# Patient Record
Sex: MALE | Race: WHITE | NOT HISPANIC OR LATINO | Employment: OTHER | URBAN - METROPOLITAN AREA
[De-identification: names, ages, dates, MRNs, and addresses within clinical notes are randomized per-mention and may not be internally consistent; named-entity substitution may affect disease eponyms.]

---

## 2024-08-15 ENCOUNTER — APPOINTMENT (EMERGENCY)
Dept: RADIOLOGY | Facility: HOSPITAL | Age: 54
End: 2024-08-15
Payer: COMMERCIAL

## 2024-08-15 ENCOUNTER — HOSPITAL ENCOUNTER (EMERGENCY)
Facility: HOSPITAL | Age: 54
Discharge: HOME | End: 2024-08-15
Attending: EMERGENCY MEDICINE
Payer: COMMERCIAL

## 2024-08-15 VITALS
SYSTOLIC BLOOD PRESSURE: 166 MMHG | WEIGHT: 164.4 LBS | OXYGEN SATURATION: 97 % | HEIGHT: 73 IN | RESPIRATION RATE: 20 BRPM | DIASTOLIC BLOOD PRESSURE: 98 MMHG | TEMPERATURE: 98.3 F | BODY MASS INDEX: 21.79 KG/M2 | HEART RATE: 112 BPM

## 2024-08-15 DIAGNOSIS — S43.409A SHOULDER SPRAIN: Primary | ICD-10-CM

## 2024-08-15 PROCEDURE — 73030 X-RAY EXAM OF SHOULDER: CPT

## 2024-08-15 PROCEDURE — 99284 EMERGENCY DEPT VISIT MOD MDM: CPT | Performed by: EMERGENCY MEDICINE

## 2024-08-15 PROCEDURE — 99283 EMERGENCY DEPT VISIT LOW MDM: CPT

## 2024-08-15 RX ORDER — ALBUTEROL SULFATE 90 UG/1
2 AEROSOL, METERED RESPIRATORY (INHALATION)
COMMUNITY
Start: 2024-06-17

## 2024-08-15 RX ORDER — NAPROXEN 500 MG/1
500 TABLET ORAL ONCE
Status: COMPLETED | OUTPATIENT
Start: 2024-08-15 | End: 2024-08-15

## 2024-08-15 RX ADMIN — NAPROXEN 500 MG: 500 TABLET ORAL at 19:12

## 2024-08-15 NOTE — DISCHARGE INSTRUCTIONS
Rest arm in sling for 2 days, then remove and do gentle range of motion exercises but don't do anything that is very painful.  Take the anti-inflammatory medicine as prescribed.  Ice the area off and on for 2 days.  Xrays look ok.

## 2024-08-15 NOTE — ED PROVIDER NOTES
History  Chief Complaint   Patient presents with    Shoulder Injury     States he lifted a bag of corn about 1 hour ago and has pain right shoulder. Mother estimates weight of bag 8-10 lbs     53 yo disabled male here with his mother who c/o right shoulder pain after lifting a bag of corn.  Occurred just prior to arrival.  Pt. Says he felt a pop in his shoulder.  No associated symptoms.      History provided by:  Patient and parent   used: No        Prior to Admission Medications   Prescriptions Last Dose Informant Patient Reported? Taking?   UNKNOWN TO PATIENT   Yes Yes   Sig: Patient thinks he takes Lisinopril and Lipitor   albuterol (PROVENTIL HFA,VENTOLIN HFA) 90 mcg/act inhaler   Yes Yes   Sig: Inhale 2 puffs      Facility-Administered Medications: None       Past Medical History:   Diagnosis Date    Asthma     Hypertension     Stroke (HCC)        History reviewed. No pertinent surgical history.    History reviewed. No pertinent family history.  I have reviewed and agree with the history as documented.    E-Cigarette/Vaping    E-Cigarette Use Never User      E-Cigarette/Vaping Substances     Social History     Tobacco Use    Smoking status: Former     Types: Cigarettes    Smokeless tobacco: Never   Vaping Use    Vaping status: Never Used   Substance Use Topics    Alcohol use: Never    Drug use: Not Currently     Types: Methamphetamines       Review of Systems   Constitutional:  Negative for fever.   Respiratory:  Negative for cough.    Gastrointestinal:  Negative for vomiting.   Musculoskeletal:  Negative for neck pain.   Skin:  Negative for wound.       Physical Exam  Physical Exam  Vitals and nursing note reviewed.   Constitutional:       General: He is not in acute distress.     Appearance: He is not ill-appearing.   Cardiovascular:      Rate and Rhythm: Normal rate and regular rhythm.      Heart sounds: Normal heart sounds.   Pulmonary:      Effort: Pulmonary effort is normal. No  respiratory distress.      Breath sounds: Normal breath sounds.   Musculoskeletal:         General: Tenderness present. No deformity. Normal range of motion.      Cervical back: Normal range of motion and neck supple. No tenderness.      Comments: Right shoulder with mild ttp proximal humerus, distal m/s intact, radial pulse palpable.   Skin:     General: Skin is warm and dry.   Neurological:      General: No focal deficit present.      Mental Status: He is alert.   Psychiatric:         Mood and Affect: Mood normal.         Behavior: Behavior normal.         Vital Signs  ED Triage Vitals [08/15/24 1822]   Temperature Pulse Respirations Blood Pressure SpO2   98.3 °F (36.8 °C) (!) 112 20 166/98 97 %      Temp Source Heart Rate Source Patient Position - Orthostatic VS BP Location FiO2 (%)   Temporal Monitor Sitting Left arm --      Pain Score       8           Vitals:    08/15/24 1822   BP: 166/98   Pulse: (!) 112   Patient Position - Orthostatic VS: Sitting         Visual Acuity      ED Medications  Medications   naproxen (NAPROSYN) tablet 500 mg (500 mg Oral Given 8/15/24 1912)       Diagnostic Studies  Results Reviewed       None                   XR shoulder 2+ views RIGHT   ED Interpretation by Annel Pena MD (08/15 1904)   negative                 Procedures  Procedures         ED Course                                 SBIRT 20yo+      Flowsheet Row Most Recent Value   Initial Alcohol Screen: US AUDIT-C     1. How often do you have a drink containing alcohol? 0 Filed at: 08/15/2024 1826   Audit-C Score 0 Filed at: 08/15/2024 1826   BETHANY: How many times in the past year have you...    Used an illegal drug or used a prescription medication for non-medical reasons? Never Filed at: 08/15/2024 1826                      Medical Decision Making  Xrays negative for fracture or dislocation.  Advised rest in sling and ice off and on for 2 days, naprosyn prn.  Note: I called mom to give her results and instructions as  nurse discharged before I got back to room to talk to them.    Amount and/or Complexity of Data Reviewed  Radiology: ordered and independent interpretation performed.    Risk  Prescription drug management.                 Disposition  Final diagnoses:   Shoulder sprain     Time reflects when diagnosis was documented in both MDM as applicable and the Disposition within this note       Time User Action Codes Description Comment    8/15/2024  7:04 PM Annel Pena Add [S43.409A] Shoulder sprain           ED Disposition       ED Disposition   Discharge    Condition   Stable    Date/Time   Thu Aug 15, 2024 1904    Comment   Teto Hernandez III discharge to home/self care.                   Follow-up Information       Follow up With Specialties Details Why Contact Info    Guanaco Gottlieb MD Orthopedic Surgery  As needed, If symptoms worsen [address], [address]  [facility] [address] [phone]              Discharge Medication List as of 8/15/2024  7:06 PM        CONTINUE these medications which have NOT CHANGED    Details   albuterol (PROVENTIL HFA,VENTOLIN HFA) 90 mcg/act inhaler Inhale 2 puffs, Starting Mon 6/17/2024, Historical Med      UNKNOWN TO PATIENT Patient thinks he takes Lisinopril and Lipitor, Historical Med             No discharge procedures on file.    PDMP Review       None            ED Provider  Electronically Signed by             Annel Pena MD  08/15/24 6425

## 2024-11-14 ENCOUNTER — TELEPHONE (OUTPATIENT)
Age: 54
End: 2024-11-14

## 2024-11-14 NOTE — TELEPHONE ENCOUNTER
Pt called and asked if we were taking new pt, I told him yes.  I asked him if he ever saw WM before, he said no.  I explained that due to his BMI, we would not be able to see him since he is not considered over weight.  Pt said thank you and hung up

## 2024-11-19 ENCOUNTER — OFFICE VISIT (OUTPATIENT)
Dept: INTERNAL MEDICINE CLINIC | Facility: CLINIC | Age: 54
End: 2024-11-19
Payer: COMMERCIAL

## 2024-11-19 VITALS
HEART RATE: 96 BPM | BODY MASS INDEX: 22.93 KG/M2 | DIASTOLIC BLOOD PRESSURE: 88 MMHG | OXYGEN SATURATION: 94 % | SYSTOLIC BLOOD PRESSURE: 152 MMHG | HEIGHT: 73 IN | WEIGHT: 173 LBS

## 2024-11-19 DIAGNOSIS — E78.2 MIXED HYPERLIPIDEMIA: ICD-10-CM

## 2024-11-19 DIAGNOSIS — Z11.59 NEED FOR HEPATITIS C SCREENING TEST: ICD-10-CM

## 2024-11-19 DIAGNOSIS — J44.9 CHRONIC OBSTRUCTIVE PULMONARY DISEASE, UNSPECIFIED COPD TYPE (HCC): ICD-10-CM

## 2024-11-19 DIAGNOSIS — Z13.1 ENCOUNTER FOR SCREENING FOR DIABETES MELLITUS: ICD-10-CM

## 2024-11-19 DIAGNOSIS — I63.9 CEREBROVASCULAR ACCIDENT (CVA), UNSPECIFIED MECHANISM (HCC): Primary | ICD-10-CM

## 2024-11-19 DIAGNOSIS — Z11.4 SCREENING FOR HIV (HUMAN IMMUNODEFICIENCY VIRUS): ICD-10-CM

## 2024-11-19 PROCEDURE — 99204 OFFICE O/P NEW MOD 45 MIN: CPT

## 2024-11-19 RX ORDER — LISINOPRIL 10 MG/1
10 TABLET ORAL DAILY
COMMUNITY

## 2024-11-19 RX ORDER — ALBUTEROL SULFATE 0.83 MG/ML
2.5 SOLUTION RESPIRATORY (INHALATION) 3 TIMES DAILY PRN
Qty: 60 ML | Refills: 0 | Status: SHIPPED | OUTPATIENT
Start: 2024-11-19 | End: 2024-11-23

## 2024-11-19 RX ORDER — ASPIRIN 81 MG/1
81 TABLET ORAL DAILY
Qty: 90 TABLET | Refills: 1 | Status: SHIPPED | OUTPATIENT
Start: 2024-11-19

## 2024-11-19 RX ORDER — MONTELUKAST SODIUM 10 MG/1
10 TABLET ORAL
COMMUNITY
End: 2024-11-19 | Stop reason: SDUPTHER

## 2024-11-19 RX ORDER — MONTELUKAST SODIUM 10 MG/1
10 TABLET ORAL
Qty: 90 TABLET | Refills: 1 | Status: SHIPPED | OUTPATIENT
Start: 2024-11-19

## 2024-11-19 RX ORDER — ALBUTEROL SULFATE 90 UG/1
2 INHALANT RESPIRATORY (INHALATION) EVERY 6 HOURS PRN
Qty: 18 G | Refills: 1 | Status: SHIPPED | OUTPATIENT
Start: 2024-11-19

## 2024-11-19 RX ORDER — ALBUTEROL SULFATE 0.83 MG/ML
2.5 SOLUTION RESPIRATORY (INHALATION) 3 TIMES DAILY PRN
COMMUNITY
End: 2024-11-19 | Stop reason: SDUPTHER

## 2024-11-19 RX ORDER — ATORVASTATIN CALCIUM 80 MG/1
80 TABLET, FILM COATED ORAL DAILY
Qty: 90 TABLET | Refills: 1 | Status: SHIPPED | OUTPATIENT
Start: 2024-11-19

## 2024-11-19 NOTE — PROGRESS NOTES
Name: Teto Hernandez III      : 1970      MRN: [id number]  Encounter Provider: Juana White MD  Encounter Date: 2024   Encounter department: [facility] INTERNAL MEDICINE  :  Assessment & Plan  Cerebrovascular accident (CVA), unspecified mechanism (HCC)  CVA x 2 ( & )  Pt recently incarcerated, has not been following with Neurology  Has not been taking DAPT (Plavix and asa)  Referral provided for Neurology  Restart asa and follow up with Neuro for recommendation to restart Plavix  Continue with high dose statin therapy  Check basic labs   Orders:    aspirin (Aspirin 81) 81 mg EC tablet; Take 1 tablet (81 mg total) by mouth daily    Ambulatory Referral to Neurology; Future    atorvastatin (LIPITOR) 80 mg tablet; Take 1 tablet (80 mg total) by mouth daily    CBC and differential; Future    Comprehensive metabolic panel; Future    Lipid Panel with Direct LDL reflex; Future    TSH, 3rd generation with Free T4 reflex; Future    Hemoglobin A1C; Future    Chronic obstructive pulmonary disease, unspecified COPD type (HCC)  Continue with albuterol neb treatments and Singulair  Referral provided to Pulm for evaluation and treatment  Orders:    Ambulatory Referral to Pulmonology; Future    montelukast (SINGULAIR) 10 mg tablet; Take 1 tablet (10 mg total) by mouth daily at bedtime    albuterol (PROVENTIL HFA,VENTOLIN HFA) 90 mcg/act inhaler; Inhale 2 puffs every 6 (six) hours as needed for wheezing    albuterol (2.5 mg/3 mL) 0.083 % nebulizer solution; Take 3 mL (2.5 mg total) by nebulization 3 (three) times a day as needed for wheezing or shortness of breath    Mixed hyperlipidemia    Orders:    TSH, 3rd generation with Free T4 reflex; Future    Encounter for screening for diabetes mellitus    Orders:    Hemoglobin A1C; Future    Screening for HIV (human immunodeficiency virus)    Orders:    HIV 1/2 AG/AB w Reflex [facility] for 2 yr old and above; Future    Need for hepatitis C screening  test    Orders:    Hepatitis C Antibody; Future    Return in about 2 weeks (around 12/3/2024) for Recheck labs.       History of Present Illness     HPI  Teto Hernandez III is a 51 y.o. male who presents as a new patient to establish care. Previously following providers out of network but recently moved in with his mother in the area. He has a PMHx of CVA x2 (2015, 2021), CAD s/p 2 stents, HTN, smoking, COPD, recent incarceration and hx of high risk sexual behavior recently taking PreP.     He presents with worsening SOB w/ wheezing. Has been using albuterol inhaler.     Has not been following with Pulm or Neuro. Denies other concerning symptoms at this time.      Review of Systems   Constitutional:  Negative for chills and fever.   HENT:  Negative for congestion.    Eyes:  Negative for visual disturbance.   Respiratory:  Positive for shortness of breath and wheezing. Negative for cough and chest tightness.    Cardiovascular:  Negative for chest pain and palpitations.   Gastrointestinal:  Negative for abdominal pain.   Genitourinary:  Negative for difficulty urinating.   Musculoskeletal:  Negative for arthralgias and myalgias.   Neurological:  Negative for dizziness, weakness, light-headedness, numbness and headaches.   Psychiatric/Behavioral:  Negative for confusion.      Past Medical History   Past Medical History:   Diagnosis Date    Asthma     Hypertension     Stroke (HCC)      History reviewed. No pertinent surgical history.  History reviewed. No pertinent family history.   reports that he has been smoking cigarettes. He has never used smokeless tobacco. He reports that he does not currently use drugs after having used the following drugs: Methamphetamines. He reports that he does not drink alcohol.  Current Outpatient Medications on File Prior to Visit   Medication Sig Dispense Refill    lisinopril (ZESTRIL) 10 mg tablet Take 10 mg by mouth daily      [DISCONTINUED] albuterol (2.5 mg/3 mL) 0.083 % nebulizer  solution Take 2.5 mg by nebulization 3 (three) times a day as needed for wheezing or shortness of breath      [DISCONTINUED] albuterol (PROVENTIL HFA,VENTOLIN HFA) 90 mcg/act inhaler Inhale 2 puffs      [DISCONTINUED] montelukast (SINGULAIR) 10 mg tablet Take 10 mg by mouth daily at bedtime      UNKNOWN TO PATIENT Patient thinks he takes Lisinopril and Lipitor       No current facility-administered medications on file prior to visit.     Allergies   Allergen Reactions    Haloperidol Hives      Pertinent Medical History          Medical History Reviewed by provider this encounter:  Tobacco  Allergies  Meds  Problems  Med Hx  Surg Hx  Fam Hx     .  Past Medical History   Past Medical History:   Diagnosis Date    Asthma     Hypertension     Stroke (HCC)      History reviewed. No pertinent surgical history.  History reviewed. No pertinent family history.   reports that he has been smoking cigarettes. He has never used smokeless tobacco. He reports that he does not currently use drugs after having used the following drugs: Methamphetamines. He reports that he does not drink alcohol.  Current Outpatient Medications on File Prior to Visit   Medication Sig Dispense Refill    lisinopril (ZESTRIL) 10 mg tablet Take 10 mg by mouth daily      [DISCONTINUED] albuterol (2.5 mg/3 mL) 0.083 % nebulizer solution Take 2.5 mg by nebulization 3 (three) times a day as needed for wheezing or shortness of breath      [DISCONTINUED] albuterol (PROVENTIL HFA,VENTOLIN HFA) 90 mcg/act inhaler Inhale 2 puffs      [DISCONTINUED] montelukast (SINGULAIR) 10 mg tablet Take 10 mg by mouth daily at bedtime      UNKNOWN TO PATIENT Patient thinks he takes Lisinopril and Lipitor       No current facility-administered medications on file prior to visit.     Allergies   Allergen Reactions    Haloperidol Hives      Current Outpatient Medications on File Prior to Visit   Medication Sig Dispense Refill    lisinopril (ZESTRIL) 10 mg tablet Take 10 mg  "by mouth daily      [DISCONTINUED] albuterol (2.5 mg/3 mL) 0.083 % nebulizer solution Take 2.5 mg by nebulization 3 (three) times a day as needed for wheezing or shortness of breath      [DISCONTINUED] albuterol (PROVENTIL HFA,VENTOLIN HFA) 90 mcg/act inhaler Inhale 2 puffs      [DISCONTINUED] montelukast (SINGULAIR) 10 mg tablet Take 10 mg by mouth daily at bedtime      UNKNOWN TO PATIENT Patient thinks he takes Lisinopril and Lipitor       No current facility-administered medications on file prior to visit.      Social History     Tobacco Use    Smoking status: Every Day     Current packs/day: 1.00     Types: Cigarettes    Smokeless tobacco: Never   Vaping Use    Vaping status: Never Used   Substance and Sexual Activity    Alcohol use: Never    Drug use: Not Currently     Types: Methamphetamines    Sexual activity: Not on file        Objective   /88   Pulse 96   Ht 6' 1\" (1.854 m)   Wt 78.5 kg (173 lb)   SpO2 94%   BMI 22.82 kg/m²      Physical Exam  Vitals and nursing note reviewed.   Constitutional:       General: He is not in acute distress.     Appearance: Normal appearance. He is normal weight. He is not ill-appearing, toxic-appearing or diaphoretic.   HENT:      Head: Normocephalic.   Eyes:      General:         Right eye: No discharge.         Left eye: No discharge.      Extraocular Movements: Extraocular movements intact.      Conjunctiva/sclera: Conjunctivae normal.   Cardiovascular:      Rate and Rhythm: Normal rate and regular rhythm.      Pulses: Normal pulses.      Heart sounds: Normal heart sounds.   Pulmonary:      Effort: Pulmonary effort is normal.      Breath sounds: Wheezing present.   Musculoskeletal:         General: Normal range of motion.      Cervical back: Normal range of motion.   Skin:     General: Skin is warm and dry.   Neurological:      Mental Status: He is alert and oriented to person, place, and time.   Psychiatric:         Mood and Affect: Affect is flat.         " Behavior: Behavior normal.

## 2024-11-21 ENCOUNTER — TELEPHONE (OUTPATIENT)
Age: 54
End: 2024-11-21

## 2024-11-21 NOTE — TELEPHONE ENCOUNTER
Pt called to request to have Dr White fax a letter to [employer] to have them provide transportation to his appts.  Pt said he does not drive.  The fax number is [phone].  He said she can call them at [phone] if she has any questions.

## 2024-11-23 DIAGNOSIS — J44.9 CHRONIC OBSTRUCTIVE PULMONARY DISEASE, UNSPECIFIED COPD TYPE (HCC): ICD-10-CM

## 2024-11-23 RX ORDER — ALBUTEROL SULFATE 0.83 MG/ML
2.5 SOLUTION RESPIRATORY (INHALATION) 3 TIMES DAILY PRN
Qty: 75 ML | Refills: 1 | Status: SHIPPED | OUTPATIENT
Start: 2024-11-23

## 2024-11-29 ENCOUNTER — CONSULT (OUTPATIENT)
Dept: PULMONOLOGY | Facility: MEDICAL CENTER | Age: 54
End: 2024-11-29
Payer: COMMERCIAL

## 2024-11-29 VITALS
OXYGEN SATURATION: 97 % | DIASTOLIC BLOOD PRESSURE: 80 MMHG | SYSTOLIC BLOOD PRESSURE: 122 MMHG | HEART RATE: 85 BPM | HEIGHT: 73 IN | BODY MASS INDEX: 23.17 KG/M2 | RESPIRATION RATE: 12 BRPM | TEMPERATURE: 97.3 F | WEIGHT: 174.8 LBS

## 2024-11-29 DIAGNOSIS — F17.210 SMOKING GREATER THAN 20 PACK YEARS: ICD-10-CM

## 2024-11-29 DIAGNOSIS — F17.210 CIGARETTE NICOTINE DEPENDENCE WITHOUT COMPLICATION: ICD-10-CM

## 2024-11-29 DIAGNOSIS — J44.9 CHRONIC OBSTRUCTIVE PULMONARY DISEASE, UNSPECIFIED COPD TYPE (HCC): Primary | ICD-10-CM

## 2024-11-29 DIAGNOSIS — R06.09 DYSPNEA ON EXERTION: ICD-10-CM

## 2024-11-29 PROCEDURE — 94010 BREATHING CAPACITY TEST: CPT | Performed by: INTERNAL MEDICINE

## 2024-11-29 PROCEDURE — 99204 OFFICE O/P NEW MOD 45 MIN: CPT | Performed by: INTERNAL MEDICINE

## 2024-11-29 RX ORDER — PREDNISONE 20 MG/1
TABLET ORAL
Qty: 15 TABLET | Refills: 0 | Status: SHIPPED | OUTPATIENT
Start: 2024-11-29

## 2024-11-29 RX ORDER — BUDESONIDE, GLYCOPYRROLATE, AND FORMOTEROL FUMARATE 160; 9; 4.8 UG/1; UG/1; UG/1
2 AEROSOL, METERED RESPIRATORY (INHALATION) 2 TIMES DAILY
Qty: 10.7 G | Refills: 7 | Status: SHIPPED | OUTPATIENT
Start: 2024-11-29

## 2024-11-29 RX ORDER — NICOTINE 21 MG/24HR
1 PATCH, TRANSDERMAL 24 HOURS TRANSDERMAL EVERY 24 HOURS
Qty: 28 PATCH | Refills: 0 | Status: SHIPPED | OUTPATIENT
Start: 2024-11-29

## 2024-11-29 RX ORDER — ALBUTEROL SULFATE 90 UG/1
2 INHALANT RESPIRATORY (INHALATION) EVERY 4 HOURS PRN
Qty: 18 G | Refills: 7 | Status: SHIPPED | OUTPATIENT
Start: 2024-11-29

## 2024-11-29 NOTE — PROGRESS NOTES
Assessment/Plan:       Problem List Items Addressed This Visit          Respiratory    Chronic obstructive pulmonary disease (HCC) - Primary    He was told he may have asthma but a suspect his main problem is really COPD.  Never had asthma when he was younger.  He has been smoking anywhere from 1 to 2 pack or 6/day since age 20.  Still smoking and actually increased his smoking to 2 packs in past several months.    From today suggests he has severe airflow obstruction.  FEV1 is 1.5 L or 35% taken obstructive ratio was 59%.  Severe restrictive impairment on spirometry likely due to air trapping    I did prescribe Breztri 2 puffs twice a day and gave him sample.  Festively using inhaler too much we will switch him to Trelegy 100 mcg 1 puff daily.  Does have albuterol inhaler or nebulizer with albuterol he can use as needed.    I also ordered complete PFT done to get done before next office visit         Relevant Medications    Budeson-Glycopyrrol-Formoterol (Breztri Aerosphere) 160-9-4.8 MCG/ACT AERO    albuterol (Ventolin HFA) 90 mcg/act inhaler    predniSONE 20 mg tablet    Other Relevant Orders    Complete PFT with post Bronchodilator and Six Minute walk       Behavioral Health    Cigarette nicotine dependence without complication    Is still smoking cigarettes.  Has started smoking 1 pack/per day at age 20 and last year has increased up to 2 packs/day.  I did talk about smoking sensation.  Tried Chantix in past with no improvement.  Did recommend he try nicotine patch 21 mg.    I did tell him he is also eligible for lung cancer screening CT of chest and he was agreeable to this.  I did place order.         Relevant Medications    nicotine (NICODERM CQ) 21 mg/24 hr TD 24 hr patch    Other Relevant Orders    CT lung screening program    Complete PFT with post Bronchodilator and Six Minute walk       Other    Dyspnea on exertion    Has had shortness of breath with activity.  I did prescribe prednisone 40 mg for 5  "days then 20 mg for 5 days.  Get some shortness of breath with activity.  I offered to send him to pulmonary rehab but he declined.         Relevant Medications    predniSONE 20 mg tablet    Other Relevant Orders    POCT spirometry    Complete PFT with post Bronchodilator and Six Minute walk     Other Visit Diagnoses         Smoking greater than 20 pack years                  Plan for follow up:    Return in about 3 months (around 2/28/2025).  All questions are answered to the patient's satisfaction and understanding.  He verbalizes understanding.  He is encouraged to call with any further questions or concerns.    Portions of the record may have been created with voice recognition software.  Occasional wrong word or \"sound a like\" substitutions may have occurred due to the inherent limitations of voice recognition software.  Read the chart carefully and recognize, using context, where substitutions have occurred.a    Electronically Signed by Geremias Modi DO    ______________________________________________________________________    Chief Complaint: shortness of breath, cough       Patient ID: Teto is a 54 y.o. y.o. male has a past medical history of Asthma, Hypertension, and Stroke ([facility]).    11/29/2024  Patient presents today for initial visit for COPD    HPI    Teto is 54 years old and has been smoking since age 20.  He does have a chronic cough and sometimes will expectorate some white mucus.  He has chronic shortness of breath with exertion and this has been slightly worse recently.  No fever or chills.  Does get occasional wheeze.  He used to have albuterol inhaler but ran out of his prescription for this and now is using Primatene Mist over-the-counter.  He had been using it several times a day.  He does get short of breath going up a flight of stairs or walking a couple 100 feet.  Denies any significant seasonal allergies.  He is on montelukast 10 mg daily and is not sure why he was on that.  He " was told he may have some asthma.  He never had asthma as a child or teenager.    He has not been on any type of maintenance inhaler other than albuterol inhaler recently which he ran out of.    Teto has history of stroke x 2.  First 1 was in 2015 and some right-sided upper extremity weakness.  Also has had stroke in 2021 has history of coronary artery disease and has had 2 coronary artery stents.  Also has history of hypertension.  Review of discharge summary from [facility] he was admitted on 12/1/2015 for stroke.  Acute function left ICA/MCA and had right sided weakness.  Received tPA and he was found to have occlusion of left ICA and tandem occlusion of left MCA.  Mechanical thrombectomy for left MCA occlusion and emergent carotid stent placement.  He did improve significantly after all this was done.  Second stroke was in July 2021 he had some increased weakness right upper extremity work at that time showed left internal carotid artery stent at about a 40% stenosis.  Cerebral angiogram was done and showed left IntraStent stenosis of about 50% more distal dissection of cervical ICA.  Did have improvement with conservative therapy was discharged home.    He past several months has been smoking 2 packs of cigarettes per day.  Previously had smoked 1 pack of cigarettes per day for since around age 20.    He did live elsewhere in [place] and now lives with his mother here in vicinity of [facility]    Review of Systems   Constitutional:  Negative for chills, fever and unexpected weight change.   HENT:  Negative for congestion, rhinorrhea and sore throat.    Eyes:  Negative for discharge and redness.   Respiratory:  Positive for cough and shortness of breath.    Cardiovascular:  Negative for chest pain, palpitations and leg swelling.   Gastrointestinal:  Negative for abdominal distention, abdominal pain and nausea.   Endocrine: Negative for polydipsia and polyphagia.    Genitourinary:  Negative for dysuria.   Musculoskeletal:  Negative for joint swelling and myalgias.   Skin:  Negative for rash.   Neurological:  Negative for light-headedness.   Psychiatric/Behavioral:  Negative for decreased concentration.        Social history: He reports that he has been smoking cigarettes. He has never used smokeless tobacco. He reports that he does not currently use drugs after having used the following drugs: Methamphetamines. He reports that he does not drink alcohol.    Past surgical history: History reviewed. No pertinent surgical history.  Family history: History reviewed. No pertinent family history.      There is no immunization history on file for this patient.  Current Outpatient Medications   Medication Sig Dispense Refill    albuterol (2.5 mg/3 mL) 0.083 % nebulizer solution TAKE 3 ML (2.5 MG TOTAL) BY NEBULIZATION 3 (THREE) TIMES A DAY AS NEEDED FOR WHEEZING OR SHORTNESS OF BREATH 75 mL 1    albuterol (PROVENTIL HFA,VENTOLIN HFA) 90 mcg/act inhaler Inhale 2 puffs every 6 (six) hours as needed for wheezing 18 g 1    albuterol (Ventolin HFA) 90 mcg/act inhaler Inhale 2 puffs every 4 (four) hours as needed for wheezing 18 g 7    aspirin (Aspirin 81) 81 mg EC tablet Take 1 tablet (81 mg total) by mouth daily 90 tablet 1    atorvastatin (LIPITOR) 80 mg tablet Take 1 tablet (80 mg total) by mouth daily 90 tablet 1    Budeson-Glycopyrrol-Formoterol (Breztri Aerosphere) 160-9-4.8 MCG/ACT AERO Inhale 2 puffs 2 (two) times a day Rinse mouth after use. 10.7 g 7    lisinopril (ZESTRIL) 10 mg tablet Take 10 mg by mouth daily      montelukast (SINGULAIR) 10 mg tablet Take 1 tablet (10 mg total) by mouth daily at bedtime 90 tablet 1    nicotine (NICODERM CQ) 21 mg/24 hr TD 24 hr patch Place 1 patch on the skin over 24 hours every 24 hours 28 patch 0    predniSONE 20 mg tablet Take 2 tablets for 5 days then 1 tablet for 5 days 15 tablet 0    UNKNOWN TO PATIENT Patient thinks he takes Lisinopril  "and Lipitor       No current facility-administered medications for this visit.     Allergies: Haloperidol    Objective:  Vitals:    11/29/24 0845   BP: 122/80   BP Location: Left arm   Patient Position: Sitting   Cuff Size: Extra-Large   Pulse: 85   Resp: 12   Temp: (!) 97.3 °F (36.3 °C)   TempSrc: Temporal   SpO2: 97%   Weight: 79.3 kg (174 lb 12.8 oz)   Height: 6' 1\" (1.854 m)   Oxygen Therapy  SpO2: 97 %  .  Wt Readings from Last 3 Encounters:   11/29/24 79.3 kg (174 lb 12.8 oz)   11/19/24 78.5 kg (173 lb)   08/15/24 74.6 kg (164 lb 6.4 oz)     Body mass index is 23.06 kg/m².    Physical Exam  Vitals reviewed.   Constitutional:       General: He is not in acute distress.     Appearance: Normal appearance. He is well-developed.   HENT:      Head: Normocephalic.      Right Ear: External ear normal.      Left Ear: External ear normal.      Nose: Nose normal.      Mouth/Throat:      Mouth: Mucous membranes are moist.      Pharynx: Oropharynx is clear. No oropharyngeal exudate.      Comments: Mallampati score is 1  Eyes:      Conjunctiva/sclera: Conjunctivae normal.      Pupils: Pupils are equal, round, and reactive to light.   Neck:      Vascular: No JVD.      Trachea: No tracheal deviation.   Cardiovascular:      Rate and Rhythm: Normal rate and regular rhythm.      Heart sounds: Normal heart sounds.   Pulmonary:      Effort: Pulmonary effort is normal.      Comments: Lung sounds are clear.  No wheezes, crackles or rhonchi  Abdominal:      General: There is no distension.      Palpations: Abdomen is soft.      Tenderness: There is no abdominal tenderness. There is no guarding.   Musculoskeletal:      Cervical back: Neck supple.      Comments: No edema, cyanosis or clubbing   Lymphadenopathy:      Cervical: No cervical adenopathy.   Skin:     General: Skin is warm and dry.      Findings: No rash.   Neurological:      General: No focal deficit present.      Mental Status: He is alert and oriented to person, place, and " time.   Psychiatric:         Mood and Affect: Mood normal.         Behavior: Behavior normal.         Thought Content: Thought content normal.         Lab Review:   7/11/24    WBC  - 6.0  Hb/hct  14.1/41.4  plts 281    BUN   15/1.1 Na 137 K 4.1    Diagnostics:  Results Review Statement: I personally reviewed the following image report in PACS and associated radiology reports: chest xray. My interpretation of the radiology images/reports is: done 7/3/24    Portable chest x-ray showed heart size to be normal and lung fields to be clear.      Oximetry testing: Room air O2 saturation at rest 97% and on room air after walking 200 feet lowest O2 saturation 96%    Office Spirometry Results: done today    FVC - 2.53 L      46%  FEV1 - 1.50 L    35%  FEV1/FVC% - 59%    Severe airflow obstruction.  Moderate to severe restrictive.  Which may be due to air trapping

## 2024-11-29 NOTE — PATIENT INSTRUCTIONS
Start prednisone 20 mg - 2 tablets for 5 days then 1 tablet for 5 days    Try to cut down cigarette smoking to 1 pack a day.  Using nicotine patch daily if covered    Schedule lung cancer screening CT of chest    Schedule complete pulmonary function test    Follow-up in 3 months    Use Breztri 2 puffs twice a day.  If you have problems with this inhaler I can always switch to Trelegy 100 mcg 1 puff daily    Can use your nebulizer or use your albuterol inhaler 2 puffs every 4 hours as needed    In future if you ever want to do pulmonary rehab your eligible for this    Look into PAD prescription assistance   · History of CVA in October 2018  · CTA head and neck negative for any acute infarct, hemorrhage or mass effect, focal moderate to severe stenosis in P3 segment of right posterior cerebral artery  · Continue plavix and lipitor

## 2024-11-30 PROBLEM — F17.210 CIGARETTE NICOTINE DEPENDENCE WITHOUT COMPLICATION: Status: ACTIVE | Noted: 2024-11-30

## 2024-11-30 PROBLEM — R06.09 DYSPNEA ON EXERTION: Status: ACTIVE | Noted: 2024-11-30

## 2024-11-30 PROBLEM — J44.9 CHRONIC OBSTRUCTIVE PULMONARY DISEASE (HCC): Status: ACTIVE | Noted: 2024-11-30

## 2024-11-30 NOTE — ASSESSMENT & PLAN NOTE
He was told he may have asthma but a suspect his main problem is really COPD.  Never had asthma when he was younger.  He has been smoking anywhere from 1 to 2 pack or 6/day since age 20.  Still smoking and actually increased his smoking to 2 packs in past several months.    From today suggests he has severe airflow obstruction.  FEV1 is 1.5 L or 35% taken obstructive ratio was 59%.  Severe restrictive impairment on spirometry likely due to air trapping    I did prescribe Breztri 2 puffs twice a day and gave him sample.  Festively using inhaler too much we will switch him to Trelegy 100 mcg 1 puff daily.  Does have albuterol inhaler or nebulizer with albuterol he can use as needed.    I also ordered complete PFT done to get done before next office visit

## 2024-11-30 NOTE — ASSESSMENT & PLAN NOTE
Has had shortness of breath with activity.  I did prescribe prednisone 40 mg for 5 days then 20 mg for 5 days.  Get some shortness of breath with activity.  I offered to send him to pulmonary rehab but he declined.

## 2024-11-30 NOTE — ASSESSMENT & PLAN NOTE
Is still smoking cigarettes.  Has started smoking 1 pack/per day at age 20 and last year has increased up to 2 packs/day.  I did talk about smoking sensation.  Tried Chantix in past with no improvement.  Did recommend he try nicotine patch 21 mg.    I did tell him he is also eligible for lung cancer screening CT of chest and he was agreeable to this.  I did place order.

## 2024-12-02 ENCOUNTER — TELEPHONE (OUTPATIENT)
Age: 54
End: 2024-12-02

## 2024-12-02 NOTE — TELEPHONE ENCOUNTER
Patient called in stating that he only needs a ride home from his CT appt on 12/9 and asked if we can set this up for him. Please advise.

## 2024-12-03 ENCOUNTER — TELEPHONE (OUTPATIENT)
Age: 54
End: 2024-12-03

## 2024-12-03 NOTE — TELEPHONE ENCOUNTER
Patient needs a ride to the hospital for test he is having on 12/09/24.  Hospital told him his PCP office would set that up for him.  He will need a ride home as well.      Pt requesting a call back to confirm

## 2024-12-03 NOTE — TELEPHONE ENCOUNTER
Pulmonary ordered test and has set up the ride request. I confirmed with the patient that it was requested.

## 2024-12-06 ENCOUNTER — TELEPHONE (OUTPATIENT)
Age: 54
End: 2024-12-06

## 2024-12-06 NOTE — TELEPHONE ENCOUNTER
Patient called to inquire if we participate in their Insurance      Patient was scheduled in [place] office     Per our Insurance charts  we are not Par with that insurance in [place] , but we are Par in [place]      Patient was Rescheduled for the [place] office

## 2025-01-13 DIAGNOSIS — I10 HYPERTENSION, UNSPECIFIED TYPE: Primary | ICD-10-CM

## 2025-01-13 RX ORDER — LISINOPRIL 10 MG/1
10 TABLET ORAL DAILY
Qty: 30 TABLET | Refills: 0 | Status: SHIPPED | OUTPATIENT
Start: 2025-01-13 | End: 2025-01-20

## 2025-01-13 NOTE — TELEPHONE ENCOUNTER
Reason for call:   [x] Refill   [] Prior Auth  [] Other:     Office:   [x] PCP/Provider -   [] Specialty/Provider -     Medication: lisinopril (ZESTRIL) 10 mg tablet Take 10 mg by mouth daily         Pharmacy: [facility]/pharmacy #[id number] - [address]      Does the patient have enough for 3 days?   [] Yes   [x] No - Send as HP to POD

## 2025-01-14 ENCOUNTER — TELEPHONE (OUTPATIENT)
Age: 55
End: 2025-01-14

## 2025-01-14 NOTE — TELEPHONE ENCOUNTER
I left voicemail for Patient to call the office to confirm the 01/22 appointment with Dr. Michaud. I also left a detailed message with the office address.

## 2025-01-20 ENCOUNTER — OFFICE VISIT (OUTPATIENT)
Dept: FAMILY MEDICINE CLINIC | Facility: CLINIC | Age: 55
End: 2025-01-20
Payer: COMMERCIAL

## 2025-01-20 VITALS
WEIGHT: 177 LBS | HEIGHT: 71 IN | SYSTOLIC BLOOD PRESSURE: 160 MMHG | HEART RATE: 92 BPM | TEMPERATURE: 98.3 F | BODY MASS INDEX: 24.78 KG/M2 | DIASTOLIC BLOOD PRESSURE: 96 MMHG | OXYGEN SATURATION: 99 % | RESPIRATION RATE: 12 BRPM

## 2025-01-20 DIAGNOSIS — Z13.29 THYROID DISORDER SCREENING: ICD-10-CM

## 2025-01-20 DIAGNOSIS — Z11.59 NEED FOR HEPATITIS C SCREENING TEST: ICD-10-CM

## 2025-01-20 DIAGNOSIS — I10 PRIMARY HYPERTENSION: ICD-10-CM

## 2025-01-20 DIAGNOSIS — Z23 ENCOUNTER FOR IMMUNIZATION: ICD-10-CM

## 2025-01-20 DIAGNOSIS — J44.9 CHRONIC OBSTRUCTIVE PULMONARY DISEASE, UNSPECIFIED COPD TYPE (HCC): ICD-10-CM

## 2025-01-20 DIAGNOSIS — Z00.00 MEDICARE ANNUAL WELLNESS VISIT, INITIAL: Primary | ICD-10-CM

## 2025-01-20 DIAGNOSIS — Z12.11 ENCOUNTER FOR COLORECTAL CANCER SCREENING: ICD-10-CM

## 2025-01-20 DIAGNOSIS — E78.2 MIXED HYPERLIPIDEMIA: ICD-10-CM

## 2025-01-20 DIAGNOSIS — I63.9 CEREBROVASCULAR ACCIDENT (CVA), UNSPECIFIED MECHANISM (HCC): ICD-10-CM

## 2025-01-20 DIAGNOSIS — Z13.0 SCREENING, IRON DEFICIENCY ANEMIA: ICD-10-CM

## 2025-01-20 DIAGNOSIS — Z12.5 PROSTATE CANCER SCREENING: ICD-10-CM

## 2025-01-20 DIAGNOSIS — Z87.891 PERSONAL HISTORY OF NICOTINE DEPENDENCE: ICD-10-CM

## 2025-01-20 DIAGNOSIS — E55.9 VITAMIN D DEFICIENCY: ICD-10-CM

## 2025-01-20 DIAGNOSIS — F17.210 CIGARETTE NICOTINE DEPENDENCE WITHOUT COMPLICATION: ICD-10-CM

## 2025-01-20 DIAGNOSIS — Z23 FLU VACCINE NEED: ICD-10-CM

## 2025-01-20 DIAGNOSIS — Z23 NEED FOR COVID-19 VACCINE: ICD-10-CM

## 2025-01-20 DIAGNOSIS — R73.03 PREDIABETES: ICD-10-CM

## 2025-01-20 DIAGNOSIS — E87.8 ELECTROLYTE ABNORMALITY: ICD-10-CM

## 2025-01-20 DIAGNOSIS — Z12.12 ENCOUNTER FOR COLORECTAL CANCER SCREENING: ICD-10-CM

## 2025-01-20 PROBLEM — R06.09 DYSPNEA ON EXERTION: Status: RESOLVED | Noted: 2024-11-30 | Resolved: 2025-01-20

## 2025-01-20 PROCEDURE — 91320 SARSCV2 VAC 30MCG TRS-SUC IM: CPT

## 2025-01-20 PROCEDURE — 99214 OFFICE O/P EST MOD 30 MIN: CPT | Performed by: STUDENT IN AN ORGANIZED HEALTH CARE EDUCATION/TRAINING PROGRAM

## 2025-01-20 PROCEDURE — 90480 ADMN SARSCOV2 VAC 1/ONLY CMP: CPT

## 2025-01-20 PROCEDURE — G0008 ADMIN INFLUENZA VIRUS VAC: HCPCS

## 2025-01-20 PROCEDURE — G0438 PPPS, INITIAL VISIT: HCPCS | Performed by: STUDENT IN AN ORGANIZED HEALTH CARE EDUCATION/TRAINING PROGRAM

## 2025-01-20 PROCEDURE — G2211 COMPLEX E/M VISIT ADD ON: HCPCS | Performed by: STUDENT IN AN ORGANIZED HEALTH CARE EDUCATION/TRAINING PROGRAM

## 2025-01-20 PROCEDURE — 90673 RIV3 VACCINE NO PRESERV IM: CPT

## 2025-01-20 RX ORDER — LOSARTAN POTASSIUM 50 MG/1
50 TABLET ORAL DAILY
Qty: 90 TABLET | Refills: 1 | Status: SHIPPED | OUTPATIENT
Start: 2025-01-20

## 2025-01-20 RX ORDER — AMLODIPINE BESYLATE 5 MG/1
5 TABLET ORAL DAILY
Qty: 90 TABLET | Refills: 1 | Status: SHIPPED | OUTPATIENT
Start: 2025-01-20

## 2025-01-20 RX ORDER — ALBUTEROL SULFATE 90 UG/1
2 INHALANT RESPIRATORY (INHALATION) EVERY 4 HOURS PRN
Qty: 18 G | Refills: 7 | Status: SHIPPED | OUTPATIENT
Start: 2025-01-20

## 2025-01-20 RX ORDER — MONTELUKAST SODIUM 10 MG/1
10 TABLET ORAL
Qty: 90 TABLET | Refills: 1 | Status: SHIPPED | OUTPATIENT
Start: 2025-01-20

## 2025-01-20 RX ORDER — ATORVASTATIN CALCIUM 80 MG/1
80 TABLET, FILM COATED ORAL DAILY
Qty: 90 TABLET | Refills: 1 | Status: SHIPPED | OUTPATIENT
Start: 2025-01-20

## 2025-01-20 NOTE — ASSESSMENT & PLAN NOTE
History of multiple strokes, focus on cardiovascular markers, patient has appropriate follow-up with neurology, appreciate recommendations, continue aspirin/high intensity statin therapy.  Orders:  •  Ambulatory Referral to Neurology; Future  •  atorvastatin (LIPITOR) 80 mg tablet; Take 1 tablet (80 mg total) by mouth daily

## 2025-01-20 NOTE — ASSESSMENT & PLAN NOTE
Recommend stopping lisinopril given chronic cough symptoms, start losartan/amlodipine, recheck in office planned  Orders:  •  losartan (COZAAR) 50 mg tablet; Take 1 tablet (50 mg total) by mouth daily  •  amLODIPine (NORVASC) 5 mg tablet; Take 1 tablet (5 mg total) by mouth daily

## 2025-01-20 NOTE — ASSESSMENT & PLAN NOTE
Orders:  •  Lipid panel; Future  •  atorvastatin (LIPITOR) 80 mg tablet; Take 1 tablet (80 mg total) by mouth daily

## 2025-01-20 NOTE — ASSESSMENT & PLAN NOTE
Stable, follows with pulmonology, appreciate recommendations continue inhaler therapies and montelukast  Orders:  •  albuterol (Ventolin HFA) 90 mcg/act inhaler; Inhale 2 puffs every 4 (four) hours as needed for wheezing  •  montelukast (SINGULAIR) 10 mg tablet; Take 1 tablet (10 mg total) by mouth daily at bedtime

## 2025-01-20 NOTE — PATIENT INSTRUCTIONS
Medicare Preventive Visit Patient Instructions  Thank you for completing your Welcome to Medicare Visit or Medicare Annual Wellness Visit today. Your next wellness visit will be due in one year (1/21/2026).  The screening/preventive services that you may require over the next 5-10 years are detailed below. Some tests may not apply to you based off risk factors and/or age. Screening tests ordered at today's visit but not completed yet may show as past due. Also, please note that scanned in results may not display below.  Preventive Screenings:  Service Recommendations Previous Testing/Comments   Colorectal Cancer Screening  Colonoscopy    Fecal Occult Blood Test (FOBT)/Fecal Immunochemical Test (FIT)  Fecal DNA/Cologuard Test  Flexible Sigmoidoscopy Age: 45-75 years old   Colonoscopy: every 10 years (May be performed more frequently if at higher risk)  OR  FOBT/FIT: every 1 year  OR  Cologuard: every 3 years  OR  Sigmoidoscopy: every 5 years  Screening may be recommended earlier than age 45 if at higher risk for colorectal cancer. Also, an individualized decision between you and your healthcare provider will decide whether screening between the ages of 76-85 would be appropriate. Colonoscopy: Not on file  FOBT/FIT: Not on file  Cologuard: Not on file  Sigmoidoscopy: Not on file          Prostate Cancer Screening Individualized decision between patient and health care provider in men between ages of 55-69   Medicare will cover every 12 months beginning on the day after your 50th birthday PSA: No results in last 5 years           Hepatitis C Screening Once for adults born between 1945 and 1965  More frequently in patients at high risk for Hepatitis C Hep C Antibody: Not on file        Diabetes Screening 1-2 times per year if you're at risk for diabetes or have pre-diabetes Fasting glucose: No results in last 5 years (No results in last 5 years)  A1C: No results in last 5 years (No results in last 5 years)       Cholesterol Screening Once every 5 years if you don't have a lipid disorder. May order more often based on risk factors. Lipid panel: Not on file         Other Preventive Screenings Covered by Medicare:  Abdominal Aortic Aneurysm (AAA) Screening: covered once if your at risk. You're considered to be at risk if you have a family history of AAA or a male between the age of 65-75 who smoking at least 100 cigarettes in your lifetime.  Lung Cancer Screening: covers low dose CT scan once per year if you meet all of the following conditions: (1) Age 55-77; (2) No signs or symptoms of lung cancer; (3) Current smoker or have quit smoking within the last 15 years; (4) You have a tobacco smoking history of at least 20 pack years (packs per day x number of years you smoked); (5) You get a written order from a healthcare provider.  Glaucoma Screening: covered annually if you're considered high risk: (1) You have diabetes OR (2) Family history of glaucoma OR (3)  aged 50 and older OR (4)  American aged 65 and older  Osteoporosis Screening: covered every 2 years if you meet one of the following conditions: (1) Have a vertebral abnormality; (2) On glucocorticoid therapy for more than 3 months; (3) Have primary hyperparathyroidism; (4) On osteoporosis medications and need to assess response to drug therapy.  HIV Screening: covered annually if you're between the age of 15-65. Also covered annually if you are younger than 15 and older than 65 with risk factors for HIV infection. For pregnant patients, it is covered up to 3 times per pregnancy.    Immunizations:  Immunization Recommendations   Influenza Vaccine Annual influenza vaccination during flu season is recommended for all persons aged >= 6 months who do not have contraindications   Pneumococcal Vaccine   * Pneumococcal conjugate vaccine = PCV13 (Prevnar 13), PCV15 (Vaxneuvance), PCV20 (Prevnar 20)  * Pneumococcal polysaccharide vaccine = PPSV23  (Pneumovax) Adults 19-63 yo with certain risk factors or if 65+ yo  If never received any pneumonia vaccine: recommend Prevnar 20 (PCV20)  Give PCV20 if previously received 1 dose of PCV13 or PPSV23   Hepatitis B Vaccine 3 dose series if at intermediate or high risk (ex: diabetes, end stage renal disease, liver disease)   Respiratory syncytial virus (RSV) Vaccine - COVERED BY MEDICARE PART D  * RSVPreF3 (Arexvy) CDC recommends that adults 60 years of age and older may receive a single dose of RSV vaccine using shared clinical decision-making (SCDM)   Tetanus (Td) Vaccine - COST NOT COVERED BY MEDICARE PART B Following completion of primary series, a booster dose should be given every 10 years to maintain immunity against tetanus. Td may also be given as tetanus wound prophylaxis.   Tdap Vaccine - COST NOT COVERED BY MEDICARE PART B Recommended at least once for all adults. For pregnant patients, recommended with each pregnancy.   Shingles Vaccine (Shingrix) - COST NOT COVERED BY MEDICARE PART B  2 shot series recommended in those 19 years and older who have or will have weakened immune systems or those 50 years and older     Health Maintenance Due:      Topic Date Due   • Hepatitis C Screening  Never done   • HIV Screening  Never done   • Colorectal Cancer Screening  Never done     Immunizations Due:      Topic Date Due   • Pneumococcal Vaccine: Pediatrics (0 to 5 Years) and At-Risk Patients (6 to 64 Years) (1 of 2 - PCV) Never done   • Hepatitis A Vaccine (1 of 2 - Risk 2-dose series) Never done   • Influenza Vaccine (1) Never done   • COVID-19 Vaccine (1 - 2024-25 season) Never done     Advance Directives   What are advance directives?  Advance directives are legal documents that state your wishes and plans for medical care. These plans are made ahead of time in case you lose your ability to make decisions for yourself. Advance directives can apply to any medical decision, such as the treatments you want, and if  you want to donate organs.   What are the types of advance directives?  There are many types of advance directives, and each state has rules about how to use them. You may choose a combination of any of the following:  Living will:  This is a written record of the treatment you want. You can also choose which treatments you do not want, which to limit, and which to stop at a certain time. This includes surgery, medicine, IV fluid, and tube feedings.   Durable power of  for healthcare (DPAHC):  This is a written record that states who you want to make healthcare choices for you when you are unable to make them for yourself. This person, called a proxy, is usually a family member or a friend. You may choose more than 1 proxy.  Do not resuscitate (DNR) order:  A DNR order is used in case your heart stops beating or you stop breathing. It is a request not to have certain forms of treatment, such as CPR. A DNR order may be included in other types of advance directives.  Medical directive:  This covers the care that you want if you are in a coma, near death, or unable to make decisions for yourself. You can list the treatments you want for each condition. Treatment may include pain medicine, surgery, blood transfusions, dialysis, IV or tube feedings, and a ventilator (breathing machine).  Values history:  This document has questions about your views, beliefs, and how you feel and think about life. This information can help others choose the care that you would choose.  Why are advance directives important?  An advance directive helps you control your care. Although spoken wishes may be used, it is better to have your wishes written down. Spoken wishes can be misunderstood, or not followed. Treatments may be given even if you do not want them. An advance directive may make it easier for your family to make difficult choices about your care.   Cigarette Smoking and Your Health   Risks to your health if you smoke:   Nicotine and other chemicals found in tobacco damage every cell in your body. Even if you are a light smoker, you have an increased risk for cancer, heart disease, and lung disease. If you are pregnant or have diabetes, smoking increases your risk for complications.   Benefits to your health if you stop smoking:   You decrease respiratory symptoms such as coughing, wheezing, and shortness of breath.   You reduce your risk for cancers of the lung, mouth, throat, kidney, bladder, pancreas, stomach, and cervix. If you already have cancer, you increase the benefits of chemotherapy. You also reduce your risk for cancer returning or a second cancer from developing.   You reduce your risk for heart disease, blood clots, heart attack, and stroke.   You reduce your risk for lung infections, and diseases such as pneumonia, asthma, chronic bronchitis, and emphysema.  Your circulation improves. More oxygen can be delivered to your body. If you have diabetes, you lower your risk for complications, such as kidney, artery, and eye diseases. You also lower your risk for nerve damage. Nerve damage can lead to amputations, poor vision, and blindness.  You improve your body's ability to heal and to fight infections.  For more information and support to stop smoking:   Smokefree.gov  Phone: 2- 034 - 070-4233  Web Address: www.[employer].gov     © Copyright "[employer]" 2018 Information is for End User's use only and may not be sold, redistributed or otherwise used for commercial purposes. All illustrations and images included in CareNotes® are the copyrighted property of [employer] Inc[employer] or [employer]

## 2025-01-20 NOTE — PROGRESS NOTES
Name: Teto Hernandez III      : 1970      MRN: [id number]  Encounter Provider: Fernando Montiel DO  Encounter Date: 2025   Encounter department: [facility]    Assessment & Plan  Medicare annual wellness visit, initial  Establish care, screening, yearly blood work testing pending       Primary hypertension  Recommend stopping lisinopril given chronic cough symptoms, start losartan/amlodipine, recheck in office planned  Orders:  •  losartan (COZAAR) 50 mg tablet; Take 1 tablet (50 mg total) by mouth daily  •  amLODIPine (NORVASC) 5 mg tablet; Take 1 tablet (5 mg total) by mouth daily    Cerebrovascular accident (CVA), unspecified mechanism (HCC)  History of multiple strokes, focus on cardiovascular markers, patient has appropriate follow-up with neurology, appreciate recommendations, continue aspirin/high intensity statin therapy.  Orders:  •  Ambulatory Referral to Neurology; Future  •  atorvastatin (LIPITOR) 80 mg tablet; Take 1 tablet (80 mg total) by mouth daily    Need for COVID-19 vaccine    Orders:  •  COVID-19 Pfizer mRNA vaccine 12 yr and older (Comirnaty pre-filled syringe)    Flu vaccine need    Orders:  •  Flublok (recombinant) 0.5 mL IM    Chronic obstructive pulmonary disease, unspecified COPD type (HCC)  Stable, follows with pulmonology, appreciate recommendations continue inhaler therapies and montelukast  Orders:  •  albuterol (Ventolin HFA) 90 mcg/act inhaler; Inhale 2 puffs every 4 (four) hours as needed for wheezing  •  montelukast (SINGULAIR) 10 mg tablet; Take 1 tablet (10 mg total) by mouth daily at bedtime    Cigarette nicotine dependence without complication  Discussed tobacco cessation, OTC lozenges recommended       Thyroid disorder screening    Orders:  •  T4, free; Future  •  TSH, 3rd generation; Future    Electrolyte abnormality    Orders:  •  Comprehensive metabolic panel; Future    Screening, iron deficiency anemia    Orders:  •  CBC and  differential; Future    Vitamin D deficiency    Orders:  •  Vitamin D 25 hydroxy; Future    Mixed hyperlipidemia    Orders:  •  Lipid panel; Future  •  atorvastatin (LIPITOR) 80 mg tablet; Take 1 tablet (80 mg total) by mouth daily    Prediabetes    Orders:  •  Hemoglobin A1C; Future    Prostate cancer screening    Orders:  •  PSA, Total Screen; Future    Need for hepatitis C screening test    Orders:  •  Hepatitis C antibody; Future    Encounter for colorectal cancer screening    Orders:  •  Cologuard    Personal history of nicotine dependence    Orders:  •  CT lung screening program; Future    Encounter for immunization  COVID and flu immunization given today in office to            Preventive health issues were discussed with patient, and age appropriate screening tests were ordered as noted in patient's After Visit Summary. Personalized health advice and appropriate referrals for health education or preventive services given if needed, as noted in patient's After Visit Summary.    History of Present Illness     Establish care/annual physical.       Patient Care Team:  Fernando Montiel DO as PCP - General (Family Medicine)    Review of Systems   Constitutional:  Negative for chills and fever.   HENT:  Negative for ear pain and sore throat.    Eyes:  Negative for pain and visual disturbance.   Respiratory:  Negative for cough and shortness of breath.    Cardiovascular:  Negative for chest pain and palpitations.   Gastrointestinal:  Negative for abdominal pain and vomiting.   Genitourinary:  Negative for dysuria and hematuria.   Musculoskeletal:  Negative for arthralgias and back pain.   Skin:  Negative for color change and rash.   Neurological:  Negative for seizures and syncope.   All other systems reviewed and are negative.    Medical History Reviewed by provider this encounter:       Annual Wellness Visit Questionnaire   Teto is here for his Initial Wellness visit.     Health Risk Assessment:    Patient rates overall health as good. Patient feels that their physical health rating is same. Patient is satisfied with their life. Eyesight was rated as same. Hearing was rated as same. Patient feels that their emotional and mental health rating is same. Patients states they are never, rarely angry. Patient states they are never, rarely unusually tired/fatigued. Pain experienced in the last 7 days has been none. Patient states that he has experienced no weight loss or gain in last 6 months.     Depression Screening:   PHQ-2 Score: 0      Fall Risk Screening:   In the past year, patient has experienced: no history of falling in past year      Home Safety:  Patient does not have trouble with stairs inside or outside of their home. Patient has working smoke alarms and has working carbon monoxide detector. Home safety hazards include: none.     Nutrition:   Current diet is Regular.     Medications:   Patient is not currently taking any over-the-counter supplements. Patient is able to manage medications.     Activities of Daily Living (ADLs)/Instrumental Activities of Daily Living (IADLs):   Walk and transfer into and out of bed and chair?: Yes  Dress and groom yourself?: Yes    Bathe or shower yourself?: Yes    Feed yourself? Yes  Do your laundry/housekeeping?: Yes  Manage your money, pay your bills and track your expenses?: Yes  Make your own meals?: Yes    Do your own shopping?: Yes    Previous Hospitalizations:   Any hospitalizations or ED visits within the last 12 months?: No      Advance Care Planning:   Living will: No    Durable POA for healthcare: No    Advanced directive: No    Advanced directive counseling given: Yes      Cognitive Screening:   Provider or family/friend/caregiver concerned regarding cognition?: No    PREVENTIVE SCREENINGS      Cardiovascular Screening:    General: History Lipid Disorder and Risks and Benefits Discussed    Due for: Lipid Panel      Diabetes Screening:     General: Risks and  "Benefits Discussed    Due for: Blood Glucose      Colorectal Cancer Screening:     General: Risks and Benefits Discussed    Due for: Cologuard      Prostate Cancer Screening:    General: Risks and Benefits Discussed    Due for: PSA      Osteoporosis Screening:    General: Screening Not Indicated      Abdominal Aortic Aneurysm (AAA) Screening:    Risk factors include: tobacco use        General: Screening Not Indicated      Lung Cancer Screening:     General: Risks and Benefits Discussed    Due for: Low Dose CT (LDCT)      Hepatitis C Screening:    General: Risks and Benefits Discussed    Hep C Screening Accepted: Yes      Screening, Brief Intervention, and Referral to Treatment (SBIRT)    Screening  Typical number of drinks in a day: 0  Typical number of drinks in a week: 0  Interpretation: Low risk drinking behavior.    Single Item Drug Screening:  How often have you used an illegal drug (including marijuana) or a prescription medication for non-medical reasons in the past year? never    Single Item Drug Screen Score: 0  Interpretation: Negative screen for possible drug use disorder    Brief Intervention  Alcohol & drug use screenings were reviewed. No concerns regarding substance use disorder identified.     Other Counseling Topics:   Car/seat belt/driving safety, skin self-exam, sunscreen and calcium and vitamin D intake and regular weightbearing exercise.        No results found.    Objective   /96 (BP Location: Left arm, Patient Position: Sitting, Cuff Size: Standard)   Pulse 92   Temp 98.3 °F (36.8 °C) (Temporal)   Resp 12   Ht 5' 11\" (1.803 m)   Wt 80.3 kg (177 lb)   SpO2 99%   BMI 24.69 kg/m²     Physical Exam  Vitals and nursing note reviewed.   Constitutional:       General: He is not in acute distress.     Appearance: He is well-developed.   HENT:      Head: Normocephalic and atraumatic.   Eyes:      General: No scleral icterus.     Conjunctiva/sclera: Conjunctivae normal.   Cardiovascular: "      Rate and Rhythm: Normal rate and regular rhythm.      Pulses: Normal pulses.      Heart sounds: No murmur heard.  Pulmonary:      Effort: Pulmonary effort is normal. No respiratory distress.      Breath sounds: Normal breath sounds.   Abdominal:      General: Bowel sounds are normal. There is no distension.      Palpations: Abdomen is soft.      Tenderness: There is no abdominal tenderness.   Musculoskeletal:         General: No swelling. Normal range of motion.      Cervical back: Neck supple.   Skin:     General: Skin is warm and dry.      Capillary Refill: Capillary refill takes less than 2 seconds.   Neurological:      General: No focal deficit present.      Mental Status: He is alert and oriented to person, place, and time. Mental status is at baseline.   Psychiatric:         Mood and Affect: Mood normal.         Behavior: Behavior normal.

## 2025-01-22 ENCOUNTER — CONSULT (OUTPATIENT)
Age: 55
End: 2025-01-22

## 2025-01-22 VITALS
DIASTOLIC BLOOD PRESSURE: 88 MMHG | HEART RATE: 80 BPM | BODY MASS INDEX: 24.5 KG/M2 | WEIGHT: 175 LBS | SYSTOLIC BLOOD PRESSURE: 132 MMHG | HEIGHT: 71 IN | OXYGEN SATURATION: 98 %

## 2025-01-22 DIAGNOSIS — F17.200 SMOKING ADDICTION: ICD-10-CM

## 2025-01-22 DIAGNOSIS — Z86.73 CHRONIC ARTERIAL ISCHEMIC STROKE, MULTIFOCAL, MULTIPLE VASCULAR TERRITORIES: ICD-10-CM

## 2025-01-22 DIAGNOSIS — I67.2 INTRACRANIAL ATHEROSCLEROSIS: Primary | ICD-10-CM

## 2025-01-22 PROCEDURE — 99205 OFFICE O/P NEW HI 60 MIN: CPT | Performed by: PSYCHIATRY & NEUROLOGY

## 2025-01-22 NOTE — ASSESSMENT & PLAN NOTE
Orders:  •  Ambulatory Referral to Neurology  patient is to resume aspirin 81mg and lipitor 80mg.   Discussed that he has to stop smoking. Patches haven't worked in past. He's tried chantix too.

## 2025-01-22 NOTE — PROGRESS NOTES
Outpatient Neurology History and Physical  Teto Hernandez III  [id number]  54 y.o.  1970          Consult: Yes    Fernando Montiel DO      Chief Complaint   Patient presents with   • Cerebrovascular Accident           History Obtained from: Patient     HPI:       Teto Hernandez III is a 53 yo M with PMH of 2 strokes presents to establish care. Patient says he's had 2 strokes due to smoking. Patient has first stroke in 2015 in left caudate nucleus and multiple punctate acute infarctions in left posterior temporal lobe. His CTA had revealed left cervical ica tapering occlusion of 1.5cm, non occlusive thrombus at left M1. He continued smoking. In 2016, he had a stent placed in left CCA. In 2021, he had another stroke where CTA showed 40% stenosis at previously stented left ica, athero at b/l ica, 75% on left and 60% on right. His MRI brain this admission showed small left precentral gyrus dwi changes. He had cerebral angio in July of 2021 as well that showed 50% left cervical ica stenosis, dissection of left distal cervical ica, occlusion of left vert at v3 and focal stenosis of supraclinoid left ical.   Both strokes affected his right side and speech.     His LDL in 2019 was 52.     He has been checked for HIV, hepatitis, RPR and all were negative.     He has continued to smoke 1ppd and doesn't appear to have desire to quit.     He lives with his mother.     He has education up until 12th grade. He was working as deliverer for meat.       Past Medical History:   Diagnosis Date   • Asthma    • Hypertension    • Stroke (HCC)                Current Outpatient Medications on File Prior to Visit   Medication Sig Dispense Refill   • albuterol (2.5 mg/3 mL) 0.083 % nebulizer solution TAKE 3 ML (2.5 MG TOTAL) BY NEBULIZATION 3 (THREE) TIMES A DAY AS NEEDED FOR WHEEZING OR SHORTNESS OF BREATH 75 mL 1   • albuterol (Ventolin HFA) 90 mcg/act inhaler Inhale 2 puffs every 4 (four) hours as needed for wheezing 18 g 7   •  amLODIPine (NORVASC) 5 mg tablet Take 1 tablet (5 mg total) by mouth daily (Patient taking differently: Take 5 mg by mouth daily Will start today) 90 tablet 1   • aspirin (Aspirin 81) 81 mg EC tablet Take 1 tablet (81 mg total) by mouth daily 90 tablet 1   • atorvastatin (LIPITOR) 80 mg tablet Take 1 tablet (80 mg total) by mouth daily 90 tablet 1   • Budeson-Glycopyrrol-Formoterol (Breztri Aerosphere) 160-9-4.8 MCG/ACT AERO Inhale 2 puffs 2 (two) times a day Rinse mouth after use. 10.7 g 7   • losartan (COZAAR) 50 mg tablet Take 1 tablet (50 mg total) by mouth daily 90 tablet 1   • montelukast (SINGULAIR) 10 mg tablet Take 1 tablet (10 mg total) by mouth daily at bedtime 90 tablet 1     No current facility-administered medications on file prior to visit.       Allergies   Allergen Reactions   • Haloperidol Hives         Family History   Problem Relation Age of Onset   • No Known Problems Mother    • Heart attack Father    • No Known Problems Sister                 History reviewed. No pertinent surgical history.        Social History     Socioeconomic History   • Marital status: Single     Spouse name: Not on file   • Number of children: Not on file   • Years of education: Not on file   • Highest education level: Not on file   Occupational History   • Not on file   Tobacco Use   • Smoking status: Every Day     Current packs/day: 1.00     Types: Cigarettes     Passive exposure: Current   • Smokeless tobacco: Never   • Tobacco comments:     Smoked 1 ppd for 30 yrs. Currently smokes 2 ppd   Vaping Use   • Vaping status: Never Used   Substance and Sexual Activity   • Alcohol use: Never   • Drug use: Not Currently     Types: Methamphetamines   • Sexual activity: Not on file   Other Topics Concern   • Not on file   Social History Narrative   • Not on file     Social Drivers of Health     Financial Resource Strain: Not on file   Food Insecurity: Not on file   Transportation Needs: Not on file   Physical Activity: Not on  "file   Stress: Not on file   Social Connections: Not on file   Intimate Partner Violence: Not on file   Housing Stability: Not on file       Review of Systems  Refer to positive review of systems in HPI  Constitutional- No fever  Eyes- No visual change  ENT- Hearing normal  CV- No chest pain  Resp- No Shortness of breath  GI- No diarrhea  - Bladder normal  MS- No Arthritis   Skin- No rash  Psych- No depression  Endo- No DM  Heme- No nodes    PHYSICAL EXAM:    Vitals:    01/22/25 1434   BP: 132/88   Patient Position: Sitting   Cuff Size: Adult   Pulse: 80   SpO2: 98%   Weight: 79.4 kg (175 lb)   Height: 5' 11\" (1.803 m)         Appearance: No Acute Distress  Ophthalmoscopic: Disc Flat, Normal fundus  Carotid/Heart/Peripheral Vascular: No Bruits, RRR  Orientation: Awake, Alert, and Oriented x 3  Mental status:  Memory: Registation 3/3 Recall 3/3  Attention: Normal  Knowledge: Appropriate  Language: No aphasia  Speech: mild dysarthria  Cranial Nerves:  2 No Visual Defect on Confrontation; Pupils round, equal, reactive to light  3,4,6 Extraocular Movements Intact; no nystagmus  5 Facial Sensation Intact  7 No facial asymmetry  8 Intact hearing  9,10 Palate symmetric, normal gag  11 Good shoulder shrug  12 Tongue Midline  Gait: Stable, No ataxia, can perform tandem walking  Coordination: No ataxia with finger to nose testing and heel to shin testing  Sensory: Intact, Symmetric to Pinprick, Light Touch, Vibration, and Joint Position  Muscle Tone: Normal  Muscle exam  Arm Right Left Leg Right Left   Deltoid 5/5 5/5 Iliopsoas 4+/5 5/5   Biceps 5/5 5/5 Quads 5/5 5/5   Triceps 5/5 5/5 Hamstrings 5/5 5/5   Wrist Extension 5/5 5/5 Ankle Dorsi Flexion 4-/5 5/5   Wrist Flexion 5/5 5/5 Ankle Plantar Flexion 5/5 5/5   Interossei 5/5 5/5 Ankle Eversion 5/5 5/5   APB 5/5 5/5 Ankle Inversion 5/5 5/5       Reflexes   RJ BJ TJ KJ AJ Plantars Villalobos's   Right 2+ 2+ 2+ 3+ 2+ Downgoing Not present   Left 2+ 2+ 2+ 2+ 2+ Downgoing Not " present         Personal review of        Reviewed multiple CTAs since 2015, 2 MRIs from [facility].     Assessment/Plan:     Assessment & Plan  Chronic arterial ischemic stroke, multifocal, multiple vascular territories    Orders:  •  Ambulatory Referral to Neurology  patient is to resume aspirin 81mg and lipitor 80mg.   Discussed that he has to stop smoking. Patches haven't worked in past. He's tried chantix too.   Intracranial atherosclerosis  He needs to stop smoking.        Smoking addiction  Discussed that he has no option but to have strong will and just stop smoking as his strokes will continue if he doesn't stop.                        Counseling Documentation:  The patient and/or patient's family were  counseled regarding diagnostic results. Instructions for management,risk factor reductions,prognosis of disease were discussed. Patient and family were educated regarding impressions,risks and benefits of treatment options,importance of compliance with treatment.        Total time of encounter: 60 min  More than 50% of time was spent in counseling and coordination of care of patient.     Shira Michaud M.D.  [facility]  [address] [place] [id number]

## 2025-01-31 LAB — COLOGUARD RESULT REPORTABLE: NEGATIVE

## 2025-02-03 ENCOUNTER — RESULTS FOLLOW-UP (OUTPATIENT)
Dept: FAMILY MEDICINE CLINIC | Facility: CLINIC | Age: 55
End: 2025-02-03

## 2025-02-27 DIAGNOSIS — I10 HYPERTENSION, UNSPECIFIED TYPE: ICD-10-CM

## 2025-02-27 RX ORDER — LISINOPRIL 10 MG/1
10 TABLET ORAL DAILY
Qty: 30 TABLET | Refills: 0 | OUTPATIENT
Start: 2025-02-27

## 2025-03-03 ENCOUNTER — TELEPHONE (OUTPATIENT)
Age: 55
End: 2025-03-03

## 2025-03-03 ENCOUNTER — OFFICE VISIT (OUTPATIENT)
Dept: PULMONOLOGY | Facility: MEDICAL CENTER | Age: 55
End: 2025-03-03
Payer: COMMERCIAL

## 2025-03-03 VITALS
RESPIRATION RATE: 12 BRPM | WEIGHT: 176.2 LBS | HEART RATE: 89 BPM | OXYGEN SATURATION: 98 % | DIASTOLIC BLOOD PRESSURE: 96 MMHG | SYSTOLIC BLOOD PRESSURE: 162 MMHG | TEMPERATURE: 97.1 F | HEIGHT: 71 IN | BODY MASS INDEX: 24.67 KG/M2

## 2025-03-03 DIAGNOSIS — F17.210 CIGARETTE NICOTINE DEPENDENCE WITHOUT COMPLICATION: ICD-10-CM

## 2025-03-03 DIAGNOSIS — J44.9 CHRONIC OBSTRUCTIVE PULMONARY DISEASE, UNSPECIFIED COPD TYPE (HCC): Primary | ICD-10-CM

## 2025-03-03 PROCEDURE — 99213 OFFICE O/P EST LOW 20 MIN: CPT | Performed by: INTERNAL MEDICINE

## 2025-03-03 NOTE — TELEPHONE ENCOUNTER
Patient called to let us know that their insurance is not covered at [facility] and they cannot get the required tests that were ordered

## 2025-03-03 NOTE — PROGRESS NOTES
Assessment & Plan        Problem List Items Addressed This Visit          Respiratory    Chronic obstructive pulmonary disease (HCC) - Primary    He is doing well on regimen of Breztri 2 puffs twice a day and does have rescue albuterol inhaler.    I did reprint order for him to schedule complete PFT with 6-minute walk test.  He states he will do this before next visit.            Behavioral Health    Cigarette nicotine dependence without complication    He still smoking 1/2 pack of cigarettes per day but this is less than he was smoking previously.  He has smoked anywhere from 1 to 2 packs of cigarettes for most of his adult life.  He is trying to cut down gradually.    He still has not schedule lung cancer screening CT of chest that has been ordered.  I did remind get this done before his next office visit in 6 months              Cc:  doing okay      HPI    Teto states he is cut down his cigarette smoking to 1/2 pack of cigarettes per day.  His breathing is better since DuoNeb.  Not have any shortness of breath with his usual activities.  Has been using his Breztri inhaler 2 puffs twice a day.  Does have a rescue albuterol inhaler.  Does get occasional wheeze and cough.    Orders have been placed for complete PFT and also lung cancer screening CT of chest but he has not scheduled yet.    Spirometry done in our office in November 2024 showed FEV1 to be 1.5 L or 35% of predicted with obstructive index of 59%.    He has history of smoking cigarettes ever since age 20.  He has smoked 1 to 2 packs of day most of his adult life and not cut down to 1/2 pack of squares per day recently.    He does live with his mother.  He does have history of hypertension chronic coronary artery disease with 2 coronary stents and also history of stroke.    Past Medical History:   Diagnosis Date    Asthma     Hypertension     Stroke (HCC)        History reviewed. No pertinent surgical history.      Current Outpatient Medications:      albuterol (2.5 mg/3 mL) 0.083 % nebulizer solution, TAKE 3 ML (2.5 MG TOTAL) BY NEBULIZATION 3 (THREE) TIMES A DAY AS NEEDED FOR WHEEZING OR SHORTNESS OF BREATH, Disp: 75 mL, Rfl: 1    albuterol (Ventolin HFA) 90 mcg/act inhaler, Inhale 2 puffs every 4 (four) hours as needed for wheezing, Disp: 18 g, Rfl: 7    aspirin (Aspirin 81) 81 mg EC tablet, Take 1 tablet (81 mg total) by mouth daily, Disp: 90 tablet, Rfl: 1    atorvastatin (LIPITOR) 80 mg tablet, Take 1 tablet (80 mg total) by mouth daily, Disp: 90 tablet, Rfl: 1    Budeson-Glycopyrrol-Formoterol (Breztri Aerosphere) 160-9-4.8 MCG/ACT AERO, Inhale 2 puffs 2 (two) times a day Rinse mouth after use., Disp: 10.7 g, Rfl: 7    losartan (COZAAR) 50 mg tablet, Take 1 tablet (50 mg total) by mouth daily, Disp: 90 tablet, Rfl: 1    montelukast (SINGULAIR) 10 mg tablet, Take 1 tablet (10 mg total) by mouth daily at bedtime, Disp: 90 tablet, Rfl: 1    amLODIPine (NORVASC) 5 mg tablet, Take 1 tablet (5 mg total) by mouth daily (Patient not taking: Reported on 3/3/2025), Disp: 90 tablet, Rfl: 1    Allergies   Allergen Reactions    Haloperidol Hives       Social History     Tobacco Use    Smoking status: Every Day     Current packs/day: 1.00     Types: Cigarettes     Passive exposure: Current    Smokeless tobacco: Never    Tobacco comments:     Smoked 1 ppd for 30 yrs. Currently smokes 2 ppd   Substance Use Topics    Alcohol use: Never         Family History   Problem Relation Age of Onset    No Known Problems Mother     Heart attack Father     No Known Problems Sister        Review of Systems   Constitutional:  Negative for chills, fever and unexpected weight change.   HENT:  Negative for congestion, rhinorrhea and sore throat.    Eyes:  Negative for discharge and redness.   Respiratory:  Negative for shortness of breath.         Does have periodic cough and wheeze   Cardiovascular:  Negative for chest pain, palpitations and leg swelling.   Gastrointestinal:  Negative  "for abdominal distention, abdominal pain and nausea.   Endocrine: Negative for polydipsia and polyphagia.   Genitourinary:  Negative for dysuria.   Musculoskeletal:  Negative for joint swelling and myalgias.   Skin:  Negative for rash.   Neurological:  Negative for light-headedness.   Psychiatric/Behavioral:  Negative for decreased concentration.            Vitals:    03/03/25 1016   BP: 162/96   Pulse: 89   Resp: 12   Temp: (!) 97.1 °F (36.2 °C)   SpO2: 98%     Height: 5' 11\" (180.3 cm)  IBW (Ideal Body Weight): 75.3 kg  Body mass index is 24.57 kg/m².  Weight (last 2 days)       Date/Time Weight    03/03/25 1016 79.9 (176.2)                Physical Exam  Vitals reviewed.   Constitutional:       General: He is not in acute distress.     Appearance: Normal appearance. He is well-developed.   HENT:      Head: Normocephalic.      Right Ear: External ear normal.      Left Ear: External ear normal.      Nose: Nose normal.      Mouth/Throat:      Pharynx: No oropharyngeal exudate.   Eyes:      Conjunctiva/sclera: Conjunctivae normal.      Pupils: Pupils are equal, round, and reactive to light.   Cardiovascular:      Rate and Rhythm: Normal rate and regular rhythm.      Heart sounds: Normal heart sounds.   Pulmonary:      Effort: Pulmonary effort is normal.      Comments: Faint expiratory wheeze in right lower lobe otherwise lung sounds are clear.  No crackles or rhonchi  Abdominal:      General: There is no distension.      Palpations: Abdomen is soft.      Tenderness: There is no abdominal tenderness.   Musculoskeletal:      Cervical back: Neck supple.      Comments: No edema, cyanosis or clubbing   Lymphadenopathy:      Cervical: No cervical adenopathy.   Skin:     General: Skin is warm and dry.   Neurological:      General: No focal deficit present.      Mental Status: He is alert and oriented to person, place, and time.   Psychiatric:         Mood and Affect: Mood normal.         Behavior: Behavior normal.         " Thought Content: Thought content normal.

## 2025-03-03 NOTE — ASSESSMENT & PLAN NOTE
He is doing well on regimen of Breztri 2 puffs twice a day and does have rescue albuterol inhaler.    I did reprint order for him to schedule complete PFT with 6-minute walk test.  He states he will do this before next visit.

## 2025-03-03 NOTE — ASSESSMENT & PLAN NOTE
He still smoking 1/2 pack of cigarettes per day but this is less than he was smoking previously.  He has smoked anywhere from 1 to 2 packs of cigarettes for most of his adult life.  He is trying to cut down gradually.    He still has not schedule lung cancer screening CT of chest that has been ordered.  I did remind get this done before his next office visit in 6 months

## 2025-03-03 NOTE — PATIENT INSTRUCTIONS
Schedule complete pulmonary function test and lung cancer screening CT scan at [facility]  Continue to try to reduce your smoking.    You are now down to 1/2 pack of cigarettes per day.    Follow-up in 6 months-Tuesday or Thursday

## 2025-03-25 ENCOUNTER — OFFICE VISIT (OUTPATIENT)
Dept: FAMILY MEDICINE CLINIC | Facility: CLINIC | Age: 55
End: 2025-03-25
Payer: COMMERCIAL

## 2025-03-25 VITALS
DIASTOLIC BLOOD PRESSURE: 84 MMHG | BODY MASS INDEX: 24.84 KG/M2 | SYSTOLIC BLOOD PRESSURE: 136 MMHG | RESPIRATION RATE: 16 BRPM | OXYGEN SATURATION: 97 % | TEMPERATURE: 98.1 F | HEIGHT: 71 IN | WEIGHT: 177.4 LBS | HEART RATE: 80 BPM

## 2025-03-25 DIAGNOSIS — F17.210 CIGARETTE NICOTINE DEPENDENCE WITHOUT COMPLICATION: ICD-10-CM

## 2025-03-25 DIAGNOSIS — I10 PRIMARY HYPERTENSION: Primary | ICD-10-CM

## 2025-03-25 DIAGNOSIS — G89.29 CHRONIC FOOT PAIN, RIGHT: ICD-10-CM

## 2025-03-25 DIAGNOSIS — I63.9 CEREBROVASCULAR ACCIDENT (CVA), UNSPECIFIED MECHANISM (HCC): ICD-10-CM

## 2025-03-25 DIAGNOSIS — J44.9 CHRONIC OBSTRUCTIVE PULMONARY DISEASE, UNSPECIFIED COPD TYPE (HCC): ICD-10-CM

## 2025-03-25 DIAGNOSIS — M79.671 CHRONIC FOOT PAIN, RIGHT: ICD-10-CM

## 2025-03-25 DIAGNOSIS — E78.2 MIXED HYPERLIPIDEMIA: ICD-10-CM

## 2025-03-25 PROCEDURE — 99214 OFFICE O/P EST MOD 30 MIN: CPT | Performed by: STUDENT IN AN ORGANIZED HEALTH CARE EDUCATION/TRAINING PROGRAM

## 2025-03-25 PROCEDURE — G2211 COMPLEX E/M VISIT ADD ON: HCPCS | Performed by: STUDENT IN AN ORGANIZED HEALTH CARE EDUCATION/TRAINING PROGRAM

## 2025-03-25 NOTE — PROGRESS NOTES
"Name: Teto Hernandez III      : 1970      MRN: [id number]  Encounter Provider: Fernando Montiel DO  Encounter Date: 3/25/2025   Encounter department: [facility]  :  Assessment & Plan  Primary hypertension  Blood pressure controlled, continue amlodipine/losartan, continue to monitor ambulatory setting       Cerebrovascular accident (CVA), unspecified mechanism (HCC)  Residual right-sided deficits, mild cognitive impairment, continue appropriate therapy       Chronic obstructive pulmonary disease, unspecified COPD type (HCC)  Continue inhaler therapies as prescribed       Cigarette nicotine dependence without complication  Follow-up CT lung screening       Mixed hyperlipidemia  Continue high intensity statin therapy       Chronic foot pain, right  Chronic right foot pain in the setting of CVA, recommend orthopedic/podiatry evaluation, x-ray imaging pending  Orders:  •  XR foot 3+ vw right; Future  •  Ambulatory Referral to Orthopedic Surgery; Future           History of Present Illness   Follow-up chronic disease management.      Review of Systems   Constitutional:  Negative for chills and fever.   HENT:  Negative for ear pain and sore throat.    Eyes:  Negative for pain and visual disturbance.   Respiratory:  Negative for cough and shortness of breath.    Cardiovascular:  Negative for chest pain and palpitations.   Gastrointestinal:  Negative for abdominal pain and vomiting.   Genitourinary:  Negative for dysuria and hematuria.   Musculoskeletal:  Negative for arthralgias and back pain.   Skin:  Negative for color change and rash.   Neurological:  Positive for weakness. Negative for seizures and syncope.   All other systems reviewed and are negative.      Objective   /84 (BP Location: Left arm, Patient Position: Sitting, Cuff Size: Standard)   Pulse 80   Temp 98.1 °F (36.7 °C) (Temporal)   Resp 16   Ht 5' 11\" (1.803 m)   Wt 80.5 kg (177 lb 6.4 oz)   SpO2 97%   BMI " 24.74 kg/m²      Physical Exam  Vitals and nursing note reviewed.   Constitutional:       General: He is not in acute distress.     Appearance: He is well-developed.   HENT:      Head: Normocephalic and atraumatic.   Eyes:      General: No scleral icterus.     Conjunctiva/sclera: Conjunctivae normal.   Cardiovascular:      Rate and Rhythm: Normal rate and regular rhythm.      Pulses: Normal pulses.      Heart sounds: No murmur heard.  Pulmonary:      Effort: Pulmonary effort is normal. No respiratory distress.      Breath sounds: Normal breath sounds.   Abdominal:      General: Bowel sounds are normal. There is no distension.      Palpations: Abdomen is soft.      Tenderness: There is no abdominal tenderness.   Musculoskeletal:         General: No swelling. Normal range of motion.      Cervical back: Neck supple.   Skin:     General: Skin is warm and dry.      Capillary Refill: Capillary refill takes less than 2 seconds.   Neurological:      General: No focal deficit present.      Mental Status: He is alert and oriented to person, place, and time. Mental status is at baseline.   Psychiatric:         Mood and Affect: Mood normal.         Behavior: Behavior normal.

## 2025-03-25 NOTE — LETTER
March 25, 2025     Patient: Teto Hernandez III  YOB: 1970  Date of Visit: 3/25/2025      To Whom it May Concern:    Teto Hernandez is under my professional care.     Due to history of multiple cerebrovascular accidents with residual right-sided deficits, would recommend against polygraph testing at this time.    If you have any questions or concerns, please don't hesitate to call.         Sincerely,          Fernando Montiel, DO        CC: No Recipients

## 2025-03-25 NOTE — ASSESSMENT & PLAN NOTE
Blood pressure controlled, continue amlodipine/losartan, continue to monitor ambulatory setting

## 2025-04-10 ENCOUNTER — TELEPHONE (OUTPATIENT)
Age: 55
End: 2025-04-10

## 2025-04-10 NOTE — TELEPHONE ENCOUNTER
Patient is requesting a call back from Dr Montiel regarding a notice he received from [facility] stating the doctor will not be in network or participating with his insurance.  He is upset and wants to talk to provider to see what can be done so he may continue see Dr Montiel    Please advise

## 2025-04-10 NOTE — TELEPHONE ENCOUNTER
Called patient and confirmed that we are out of network with [employer]. He will look for new insurance.

## 2025-06-10 ENCOUNTER — TELEPHONE (OUTPATIENT)
Age: 55
End: 2025-06-10

## 2025-06-10 NOTE — TELEPHONE ENCOUNTER
Paperwork faxed for Ambien, waiting outcome. Patient is changing his insurance to [employer] beginning July 1. He will call back when he has a member ID.

## 2025-06-30 ENCOUNTER — OFFICE VISIT (OUTPATIENT)
Dept: FAMILY MEDICINE CLINIC | Facility: CLINIC | Age: 55
End: 2025-06-30
Payer: COMMERCIAL

## 2025-06-30 VITALS
WEIGHT: 175.6 LBS | RESPIRATION RATE: 18 BRPM | SYSTOLIC BLOOD PRESSURE: 130 MMHG | BODY MASS INDEX: 25.14 KG/M2 | HEIGHT: 70 IN | HEART RATE: 93 BPM | DIASTOLIC BLOOD PRESSURE: 86 MMHG | OXYGEN SATURATION: 94 % | TEMPERATURE: 97.4 F

## 2025-06-30 DIAGNOSIS — G89.29 CHRONIC FOOT PAIN, RIGHT: ICD-10-CM

## 2025-06-30 DIAGNOSIS — I63.9 CEREBROVASCULAR ACCIDENT (CVA), UNSPECIFIED MECHANISM (HCC): ICD-10-CM

## 2025-06-30 DIAGNOSIS — J44.9 CHRONIC OBSTRUCTIVE PULMONARY DISEASE, UNSPECIFIED COPD TYPE (HCC): Primary | ICD-10-CM

## 2025-06-30 DIAGNOSIS — F17.210 CIGARETTE NICOTINE DEPENDENCE WITHOUT COMPLICATION: ICD-10-CM

## 2025-06-30 DIAGNOSIS — E78.2 MIXED HYPERLIPIDEMIA: ICD-10-CM

## 2025-06-30 DIAGNOSIS — M79.671 CHRONIC FOOT PAIN, RIGHT: ICD-10-CM

## 2025-06-30 DIAGNOSIS — I10 PRIMARY HYPERTENSION: ICD-10-CM

## 2025-06-30 PROCEDURE — G2211 COMPLEX E/M VISIT ADD ON: HCPCS | Performed by: STUDENT IN AN ORGANIZED HEALTH CARE EDUCATION/TRAINING PROGRAM

## 2025-06-30 PROCEDURE — 99214 OFFICE O/P EST MOD 30 MIN: CPT | Performed by: STUDENT IN AN ORGANIZED HEALTH CARE EDUCATION/TRAINING PROGRAM

## 2025-06-30 RX ORDER — ALBUTEROL SULFATE 90 UG/1
2 INHALANT RESPIRATORY (INHALATION) EVERY 4 HOURS PRN
Qty: 18 G | Refills: 7 | Status: SHIPPED | OUTPATIENT
Start: 2025-06-30

## 2025-06-30 RX ORDER — AZITHROMYCIN 250 MG/1
TABLET, FILM COATED ORAL
Qty: 6 TABLET | Refills: 0 | Status: SHIPPED | OUTPATIENT
Start: 2025-06-30 | End: 2025-07-05

## 2025-06-30 RX ORDER — METHYLPREDNISOLONE 4 MG/1
TABLET ORAL
Qty: 21 EACH | Refills: 0 | Status: SHIPPED | OUTPATIENT
Start: 2025-06-30

## 2025-06-30 RX ORDER — AMLODIPINE BESYLATE 5 MG/1
5 TABLET ORAL DAILY
Qty: 90 TABLET | Refills: 1 | Status: SHIPPED | OUTPATIENT
Start: 2025-06-30

## 2025-06-30 RX ORDER — BUDESONIDE, GLYCOPYRROLATE, AND FORMOTEROL FUMARATE 160; 9; 4.8 UG/1; UG/1; UG/1
2 AEROSOL, METERED RESPIRATORY (INHALATION) 2 TIMES DAILY
Qty: 10.7 G | Refills: 7 | Status: SHIPPED | OUTPATIENT
Start: 2025-06-30

## 2025-06-30 RX ORDER — LOSARTAN POTASSIUM 50 MG/1
50 TABLET ORAL DAILY
Qty: 90 TABLET | Refills: 1 | Status: SHIPPED | OUTPATIENT
Start: 2025-06-30

## 2025-06-30 RX ORDER — ASPIRIN 81 MG/1
81 TABLET ORAL DAILY
Qty: 90 TABLET | Refills: 1 | Status: SHIPPED | OUTPATIENT
Start: 2025-06-30

## 2025-06-30 RX ORDER — ALBUTEROL SULFATE 0.83 MG/ML
2.5 SOLUTION RESPIRATORY (INHALATION) 3 TIMES DAILY PRN
Qty: 75 ML | Refills: 1 | Status: SHIPPED | OUTPATIENT
Start: 2025-06-30

## 2025-06-30 RX ORDER — MONTELUKAST SODIUM 10 MG/1
10 TABLET ORAL
Qty: 90 TABLET | Refills: 1 | Status: SHIPPED | OUTPATIENT
Start: 2025-06-30

## 2025-06-30 RX ORDER — ATORVASTATIN CALCIUM 80 MG/1
80 TABLET, FILM COATED ORAL DAILY
Qty: 90 TABLET | Refills: 1 | Status: SHIPPED | OUTPATIENT
Start: 2025-06-30

## 2025-06-30 NOTE — PROGRESS NOTES
Name: Teto Hernandez III      : 1970      MRN: [id number]  Encounter Provider: Fernando Montiel DO  Encounter Date: 2025   Encounter department: [facility]  :  Assessment & Plan  Chronic obstructive pulmonary disease, unspecified COPD type (HCC)  Concern for acute on chronic symptoms?  Refill inhaler therapies, recommend Zithromax and Medrol Dosepak given mild wheezing on exam, symptoms worsen or not improving reevaluation recommended.  Orders:  •  albuterol (2.5 mg/3 mL) 0.083 % nebulizer solution; Take 3 mL (2.5 mg total) by nebulization 3 (three) times a day as needed for wheezing or shortness of breath  •  albuterol (Ventolin HFA) 90 mcg/act inhaler; Inhale 2 puffs every 4 (four) hours as needed for wheezing  •  Budeson-Glycopyrrol-Formoterol (Breztri Aerosphere) 160-9-4.8 MCG/ACT AERO; Inhale 2 puffs in the morning and 2 puffs before bedtime. Rinse mouth after use.  •  montelukast (SINGULAIR) 10 mg tablet; Take 1 tablet (10 mg total) by mouth daily at bedtime  •  azithromycin (Zithromax) 250 mg tablet; Take 2 tablets (500 mg total) by mouth daily for 1 day, THEN 1 tablet (250 mg total) daily for 4 days.  •  methylPREDNISolone 4 MG tablet therapy pack; Use as directed on package    Primary hypertension  Blood pressure stable, continue amlodipine/losartan  Orders:  •  amLODIPine (NORVASC) 5 mg tablet; Take 1 tablet (5 mg total) by mouth daily  •  losartan (COZAAR) 50 mg tablet; Take 1 tablet (50 mg total) by mouth daily    Cerebrovascular accident (CVA), unspecified mechanism (HCC)    Orders:  •  aspirin (Aspirin 81) 81 mg EC tablet; Take 1 tablet (81 mg total) by mouth daily    Mixed hyperlipidemia    Orders:  •  atorvastatin (LIPITOR) 80 mg tablet; Take 1 tablet (80 mg total) by mouth daily    Cigarette nicotine dependence without complication  Patient will follow-up with CT lung screening       Chronic foot pain, right  Patient will follow-up with x-ray imaging and  yearly blood work for follow-up in 2 weeks             Lung Cancer Screening Shared Decision Making: I discussed with him that he is a candidate for lung cancer CT screening.     The following Shared Decision-Making points were covered:  Benefits of screening were discussed, including the rates of reduction in death from lung cancer and other causes.  Harms of screening were reviewed, including false positive tests, radiation exposure levels, risks of invasive procedures, risks of complications of screening, and risk of overdiagnosis.  I counseled on the importance of adherence to annual lung cancer LDCT screening, impact of co-morbidities, and ability or willingness to undergo diagnosis and treatment.  I counseled on the importance of maintaining abstinence as a former smoker or was counseled on the importance of smoking cessation if a current smoker    Review of Eligibility Criteria: He meets all of the criteria for Lung Cancer Screening.   - He is 54 y.o.   - He has 20 pack year tobacco history and is a current smoker or has quit within the past 15 years  - He presents no signs or symptoms of lung cancer    After discussion, the patient decided to elect lung cancer screening.      History of Present Illness   Follow up chronic disease management.       Review of Systems   Constitutional:  Negative for chills and fever.   HENT:  Negative for ear pain and sore throat.    Eyes:  Negative for pain and visual disturbance.   Respiratory:  Negative for cough and shortness of breath.    Cardiovascular:  Negative for chest pain and palpitations.   Gastrointestinal:  Negative for abdominal pain and vomiting.   Genitourinary:  Negative for dysuria and hematuria.   Musculoskeletal:  Negative for arthralgias and back pain.   Skin:  Negative for color change and rash.   Neurological:  Negative for seizures and syncope.   Psychiatric/Behavioral:  Negative for agitation, behavioral problems and confusion.    All other systems  "reviewed and are negative.      Objective   /86 (BP Location: Left arm, Patient Position: Sitting, Cuff Size: Standard)   Pulse 93   Temp (!) 97.4 °F (36.3 °C) (Temporal)   Resp 18   Ht 5' 10\" (1.778 m)   Wt 79.7 kg (175 lb 9.6 oz)   SpO2 94%   BMI 25.20 kg/m²      Physical Exam  Vitals and nursing note reviewed.   Constitutional:       General: He is not in acute distress.     Appearance: He is well-developed.   HENT:      Head: Normocephalic and atraumatic.     Eyes:      General: No scleral icterus.     Conjunctiva/sclera: Conjunctivae normal.       Cardiovascular:      Rate and Rhythm: Normal rate and regular rhythm.      Pulses: Normal pulses.      Heart sounds: No murmur heard.  Pulmonary:      Effort: Pulmonary effort is normal. No respiratory distress.      Breath sounds: Wheezing and rhonchi present.   Abdominal:      General: There is no distension.      Palpations: Abdomen is soft.      Tenderness: There is no abdominal tenderness.     Musculoskeletal:         General: No swelling. Normal range of motion.      Cervical back: Neck supple.     Skin:     General: Skin is warm and dry.      Capillary Refill: Capillary refill takes less than 2 seconds.     Neurological:      General: No focal deficit present.      Mental Status: He is alert and oriented to person, place, and time. Mental status is at baseline.     Psychiatric:         Mood and Affect: Mood normal.         Behavior: Behavior normal.         "

## 2025-06-30 NOTE — ASSESSMENT & PLAN NOTE
Blood pressure stable, continue amlodipine/losartan  Orders:  •  amLODIPine (NORVASC) 5 mg tablet; Take 1 tablet (5 mg total) by mouth daily  •  losartan (COZAAR) 50 mg tablet; Take 1 tablet (50 mg total) by mouth daily

## 2025-06-30 NOTE — ASSESSMENT & PLAN NOTE
Concern for acute on chronic symptoms?  Refill inhaler therapies, recommend Zithromax and Medrol Dosepak given mild wheezing on exam, symptoms worsen or not improving reevaluation recommended.  Orders:  •  albuterol (2.5 mg/3 mL) 0.083 % nebulizer solution; Take 3 mL (2.5 mg total) by nebulization 3 (three) times a day as needed for wheezing or shortness of breath  •  albuterol (Ventolin HFA) 90 mcg/act inhaler; Inhale 2 puffs every 4 (four) hours as needed for wheezing  •  Budeson-Glycopyrrol-Formoterol (Breztri Aerosphere) 160-9-4.8 MCG/ACT AERO; Inhale 2 puffs in the morning and 2 puffs before bedtime. Rinse mouth after use.  •  montelukast (SINGULAIR) 10 mg tablet; Take 1 tablet (10 mg total) by mouth daily at bedtime  •  azithromycin (Zithromax) 250 mg tablet; Take 2 tablets (500 mg total) by mouth daily for 1 day, THEN 1 tablet (250 mg total) daily for 4 days.  •  methylPREDNISolone 4 MG tablet therapy pack; Use as directed on package

## 2025-07-01 ENCOUNTER — HOSPITAL ENCOUNTER (OUTPATIENT)
Dept: RADIOLOGY | Facility: HOSPITAL | Age: 55
Discharge: HOME | End: 2025-07-01
Payer: COMMERCIAL

## 2025-07-01 ENCOUNTER — APPOINTMENT (OUTPATIENT)
Dept: LAB | Facility: HOSPITAL | Age: 55
End: 2025-07-01
Attending: STUDENT IN AN ORGANIZED HEALTH CARE EDUCATION/TRAINING PROGRAM
Payer: COMMERCIAL

## 2025-07-01 DIAGNOSIS — Z12.5 PROSTATE CANCER SCREENING: ICD-10-CM

## 2025-07-01 DIAGNOSIS — E78.2 MIXED HYPERLIPIDEMIA: ICD-10-CM

## 2025-07-01 DIAGNOSIS — G89.29 CHRONIC FOOT PAIN, RIGHT: ICD-10-CM

## 2025-07-01 DIAGNOSIS — Z13.1 ENCOUNTER FOR SCREENING FOR DIABETES MELLITUS: ICD-10-CM

## 2025-07-01 DIAGNOSIS — I63.9 CEREBROVASCULAR ACCIDENT (CVA), UNSPECIFIED MECHANISM (HCC): ICD-10-CM

## 2025-07-01 DIAGNOSIS — M79.671 CHRONIC FOOT PAIN, RIGHT: ICD-10-CM

## 2025-07-01 DIAGNOSIS — Z11.4 SCREENING FOR HIV (HUMAN IMMUNODEFICIENCY VIRUS): ICD-10-CM

## 2025-07-01 DIAGNOSIS — Z11.59 NEED FOR HEPATITIS C SCREENING TEST: ICD-10-CM

## 2025-07-01 LAB
CHOLEST SERPL-MCNC: 101 MG/DL (ref ?–200)
HDLC SERPL-MCNC: 45 MG/DL
LDLC SERPL CALC-MCNC: 47 MG/DL (ref 0–100)
PSA SERPL-MCNC: 0.45 NG/ML (ref 0–4)
TRIGL SERPL-MCNC: 45 MG/DL (ref ?–150)
TSH SERPL DL<=0.05 MIU/L-ACNC: 1.38 UIU/ML (ref 0.45–4.5)

## 2025-07-01 PROCEDURE — G0103 PSA SCREENING: HCPCS

## 2025-07-01 PROCEDURE — 84443 ASSAY THYROID STIM HORMONE: CPT

## 2025-07-01 PROCEDURE — 80061 LIPID PANEL: CPT

## 2025-07-01 PROCEDURE — 87389 HIV-1 AG W/HIV-1&-2 AB AG IA: CPT

## 2025-07-01 PROCEDURE — 73630 X-RAY EXAM OF FOOT: CPT

## 2025-07-01 PROCEDURE — 36415 COLL VENOUS BLD VENIPUNCTURE: CPT

## 2025-07-02 LAB — HIV 1+2 AB+HIV1 P24 AG SERPL QL IA: NORMAL

## 2025-07-07 ENCOUNTER — RESULTS FOLLOW-UP (OUTPATIENT)
Dept: FAMILY MEDICINE CLINIC | Facility: CLINIC | Age: 55
End: 2025-07-07

## 2025-07-07 NOTE — TELEPHONE ENCOUNTER
----- Message from Fernando Montiel DO sent at 7/7/2025  6:48 AM EDT -----  Please let patient know that blood work and right foot x-ray unremarkable/within normal limits, we will review further at annual physical coming up next week.  ----- Message -----  From: Interface, Radiology Results In  Sent: 7/7/2025   5:58 AM EDT  To: Fernando Montiel DO

## 2025-07-14 ENCOUNTER — OFFICE VISIT (OUTPATIENT)
Dept: FAMILY MEDICINE CLINIC | Facility: CLINIC | Age: 55
End: 2025-07-14
Payer: COMMERCIAL

## 2025-07-14 VITALS
SYSTOLIC BLOOD PRESSURE: 150 MMHG | DIASTOLIC BLOOD PRESSURE: 82 MMHG | TEMPERATURE: 98.4 F | RESPIRATION RATE: 12 BRPM | OXYGEN SATURATION: 99 % | WEIGHT: 177 LBS | BODY MASS INDEX: 25.34 KG/M2 | HEIGHT: 70 IN | HEART RATE: 85 BPM

## 2025-07-14 DIAGNOSIS — G89.29 CHRONIC FOOT PAIN, RIGHT: Primary | ICD-10-CM

## 2025-07-14 DIAGNOSIS — I10 PRIMARY HYPERTENSION: ICD-10-CM

## 2025-07-14 DIAGNOSIS — M79.671 CHRONIC FOOT PAIN, RIGHT: Primary | ICD-10-CM

## 2025-07-14 DIAGNOSIS — J44.9 CHRONIC OBSTRUCTIVE PULMONARY DISEASE, UNSPECIFIED COPD TYPE (HCC): ICD-10-CM

## 2025-07-14 DIAGNOSIS — F17.210 CIGARETTE NICOTINE DEPENDENCE WITHOUT COMPLICATION: ICD-10-CM

## 2025-07-14 DIAGNOSIS — E78.2 MIXED HYPERLIPIDEMIA: ICD-10-CM

## 2025-07-14 DIAGNOSIS — I63.9 CEREBROVASCULAR ACCIDENT (CVA), UNSPECIFIED MECHANISM (HCC): ICD-10-CM

## 2025-07-14 PROCEDURE — 99214 OFFICE O/P EST MOD 30 MIN: CPT | Performed by: STUDENT IN AN ORGANIZED HEALTH CARE EDUCATION/TRAINING PROGRAM

## 2025-07-21 ENCOUNTER — TELEPHONE (OUTPATIENT)
Age: 55
End: 2025-07-21

## 2025-07-21 NOTE — TELEPHONE ENCOUNTER
Caller: Patient    Doctor/Office: Dr Arita    Call regarding :  appt     Call was transferred to: Pod

## 2025-07-21 NOTE — TELEPHONE ENCOUNTER
Caller: Teto Hernandez    Doctor: Dr. Arita    Reason for call: ortho attempted to transfer call and pt hung up    Call back#: NA

## 2025-07-21 NOTE — TELEPHONE ENCOUNTER
Caller: Teto    Doctor/Office: Dr Arita/[place]    Call regarding :  Calling because he received a call to reschedule his appointment.     Call was transferred to: Attempted Warm Transferred to Podiatry, when I went to connect the patient, dead air, me and the other agent said hello, call disconnected by patient.     Unsuccessful Transfer.

## 2025-07-30 ENCOUNTER — TELEPHONE (OUTPATIENT)
Age: 55
End: 2025-07-30

## 2025-08-07 ENCOUNTER — OFFICE VISIT (OUTPATIENT)
Age: 55
End: 2025-08-07
Attending: STUDENT IN AN ORGANIZED HEALTH CARE EDUCATION/TRAINING PROGRAM
Payer: COMMERCIAL

## 2025-08-07 ENCOUNTER — TELEPHONE (OUTPATIENT)
Dept: PULMONOLOGY | Facility: MEDICAL CENTER | Age: 55
End: 2025-08-07

## 2025-08-07 VITALS — BODY MASS INDEX: 25.34 KG/M2 | WEIGHT: 177 LBS | HEIGHT: 70 IN

## 2025-08-07 DIAGNOSIS — M21.171 ACQUIRED VARUS DEFORMITY OF RIGHT ANKLE: ICD-10-CM

## 2025-08-07 DIAGNOSIS — M21.371 FOOT DROP, RIGHT FOOT: Primary | ICD-10-CM

## 2025-08-07 DIAGNOSIS — G89.29 CHRONIC FOOT PAIN, RIGHT: ICD-10-CM

## 2025-08-07 DIAGNOSIS — M79.671 CHRONIC FOOT PAIN, RIGHT: ICD-10-CM

## 2025-08-07 PROCEDURE — 99203 OFFICE O/P NEW LOW 30 MIN: CPT | Performed by: STUDENT IN AN ORGANIZED HEALTH CARE EDUCATION/TRAINING PROGRAM

## 2025-08-11 ENCOUNTER — TELEPHONE (OUTPATIENT)
Age: 55
End: 2025-08-11